# Patient Record
Sex: MALE | Race: WHITE | NOT HISPANIC OR LATINO | Employment: OTHER | URBAN - METROPOLITAN AREA
[De-identification: names, ages, dates, MRNs, and addresses within clinical notes are randomized per-mention and may not be internally consistent; named-entity substitution may affect disease eponyms.]

---

## 2023-03-19 ENCOUNTER — APPOINTMENT (OUTPATIENT)
Dept: MRI IMAGING | Facility: CLINIC | Age: 68
End: 2023-03-19
Attending: EMERGENCY MEDICINE
Payer: COMMERCIAL

## 2023-03-19 ENCOUNTER — HOSPITAL ENCOUNTER (EMERGENCY)
Facility: CLINIC | Age: 68
Discharge: HOME | End: 2023-03-19
Attending: EMERGENCY MEDICINE | Admitting: EMERGENCY MEDICINE
Payer: COMMERCIAL

## 2023-03-19 VITALS
RESPIRATION RATE: 16 BRPM | WEIGHT: 239 LBS | BODY MASS INDEX: 34.22 KG/M2 | TEMPERATURE: 97.6 F | DIASTOLIC BLOOD PRESSURE: 70 MMHG | HEIGHT: 70 IN | OXYGEN SATURATION: 97 % | SYSTOLIC BLOOD PRESSURE: 122 MMHG | HEART RATE: 68 BPM

## 2023-03-19 DIAGNOSIS — H53.2 DIPLOPIA: ICD-10-CM

## 2023-03-19 LAB
ANION GAP SERPL CALCULATED.3IONS-SCNC: 14 MMOL/L (ref 5–18)
BASOPHILS # BLD AUTO: 0 10E3/UL (ref 0–0.2)
BASOPHILS NFR BLD AUTO: 0 %
BUN SERPL-MCNC: 17 MG/DL (ref 8–22)
C REACTIVE PROTEIN LHE: 0.3 MG/DL (ref 0–?)
CALCIUM SERPL-MCNC: 9.4 MG/DL (ref 8.5–10.5)
CHLORIDE BLD-SCNC: 105 MMOL/L (ref 98–107)
CO2 SERPL-SCNC: 22 MMOL/L (ref 22–31)
CREAT SERPL-MCNC: 0.78 MG/DL (ref 0.7–1.3)
EOSINOPHIL # BLD AUTO: 0.1 10E3/UL (ref 0–0.7)
EOSINOPHIL NFR BLD AUTO: 2 %
ERYTHROCYTE [DISTWIDTH] IN BLOOD BY AUTOMATED COUNT: 13.5 % (ref 10–15)
ERYTHROCYTE [SEDIMENTATION RATE] IN BLOOD BY WESTERGREN METHOD: 30 MM/HR (ref 0–15)
GFR SERPL CREATININE-BSD FRML MDRD: >90 ML/MIN/1.73M2
GLUCOSE BLD-MCNC: 109 MG/DL (ref 70–125)
HCT VFR BLD AUTO: 43 % (ref 40–53)
HGB BLD-MCNC: 14.4 G/DL (ref 13.3–17.7)
IMM GRANULOCYTES # BLD: 0 10E3/UL
IMM GRANULOCYTES NFR BLD: 0 %
LYMPHOCYTES # BLD AUTO: 2.5 10E3/UL (ref 0.8–5.3)
LYMPHOCYTES NFR BLD AUTO: 34 %
MCH RBC QN AUTO: 28.1 PG (ref 26.5–33)
MCHC RBC AUTO-ENTMCNC: 33.5 G/DL (ref 31.5–36.5)
MCV RBC AUTO: 84 FL (ref 78–100)
MONOCYTES # BLD AUTO: 0.5 10E3/UL (ref 0–1.3)
MONOCYTES NFR BLD AUTO: 7 %
NEUTROPHILS # BLD AUTO: 4.2 10E3/UL (ref 1.6–8.3)
NEUTROPHILS NFR BLD AUTO: 57 %
NRBC # BLD AUTO: 0 10E3/UL
NRBC BLD AUTO-RTO: 0 /100
PLATELET # BLD AUTO: 227 10E3/UL (ref 150–450)
POTASSIUM BLD-SCNC: 3.5 MMOL/L (ref 3.5–5)
RBC # BLD AUTO: 5.12 10E6/UL (ref 4.4–5.9)
SODIUM SERPL-SCNC: 141 MMOL/L (ref 136–145)
WBC # BLD AUTO: 7.5 10E3/UL (ref 4–11)

## 2023-03-19 PROCEDURE — 85004 AUTOMATED DIFF WBC COUNT: CPT | Performed by: EMERGENCY MEDICINE

## 2023-03-19 PROCEDURE — 99285 EMERGENCY DEPT VISIT HI MDM: CPT | Mod: 25

## 2023-03-19 PROCEDURE — A9585 GADOBUTROL INJECTION: HCPCS | Performed by: EMERGENCY MEDICINE

## 2023-03-19 PROCEDURE — 86140 C-REACTIVE PROTEIN: CPT | Performed by: EMERGENCY MEDICINE

## 2023-03-19 PROCEDURE — 86038 ANTINUCLEAR ANTIBODIES: CPT | Performed by: EMERGENCY MEDICINE

## 2023-03-19 PROCEDURE — 70544 MR ANGIOGRAPHY HEAD W/O DYE: CPT

## 2023-03-19 PROCEDURE — 96374 THER/PROPH/DIAG INJ IV PUSH: CPT

## 2023-03-19 PROCEDURE — 255N000002 HC RX 255 OP 636: Performed by: EMERGENCY MEDICINE

## 2023-03-19 PROCEDURE — 80048 BASIC METABOLIC PNL TOTAL CA: CPT | Performed by: EMERGENCY MEDICINE

## 2023-03-19 PROCEDURE — 70553 MRI BRAIN STEM W/O & W/DYE: CPT

## 2023-03-19 PROCEDURE — 250N000011 HC RX IP 250 OP 636: Performed by: EMERGENCY MEDICINE

## 2023-03-19 PROCEDURE — 36415 COLL VENOUS BLD VENIPUNCTURE: CPT | Performed by: EMERGENCY MEDICINE

## 2023-03-19 PROCEDURE — 85652 RBC SED RATE AUTOMATED: CPT | Performed by: EMERGENCY MEDICINE

## 2023-03-19 RX ORDER — ROSUVASTATIN CALCIUM 5 MG/1
5 TABLET, COATED ORAL
COMMUNITY
Start: 2022-12-08

## 2023-03-19 RX ORDER — LISINOPRIL AND HYDROCHLOROTHIAZIDE 20; 25 MG/1; MG/1
1 TABLET ORAL DAILY
COMMUNITY
Start: 2023-02-06

## 2023-03-19 RX ORDER — DICLOFENAC SODIUM 75 MG/1
TABLET, DELAYED RELEASE ORAL
COMMUNITY
Start: 2023-03-07

## 2023-03-19 RX ORDER — DICLOFENAC SODIUM 75 MG/1
TABLET, DELAYED RELEASE ORAL 2 TIMES DAILY
COMMUNITY
End: 2023-03-19

## 2023-03-19 RX ORDER — KETOROLAC TROMETHAMINE 15 MG/ML
15 INJECTION, SOLUTION INTRAMUSCULAR; INTRAVENOUS ONCE
Status: COMPLETED | OUTPATIENT
Start: 2023-03-19 | End: 2023-03-19

## 2023-03-19 RX ORDER — GADOBUTROL 604.72 MG/ML
10 INJECTION INTRAVENOUS ONCE
Status: COMPLETED | OUTPATIENT
Start: 2023-03-19 | End: 2023-03-19

## 2023-03-19 RX ADMIN — GADOBUTROL 10 ML: 604.72 INJECTION INTRAVENOUS at 13:50

## 2023-03-19 RX ADMIN — KETOROLAC TROMETHAMINE 15 MG: 15 INJECTION, SOLUTION INTRAMUSCULAR; INTRAVENOUS at 15:23

## 2023-03-19 ASSESSMENT — ACTIVITIES OF DAILY LIVING (ADL)
ADLS_ACUITY_SCORE: 35
ADLS_ACUITY_SCORE: 35

## 2023-03-19 NOTE — DISCHARGE INSTRUCTIONS
Your symptoms seem most consistent with a mild cranial nerve palsy which is an impairment of a nerve that services the muscles of the eye and is now creating 2 images that do not align.  Your case was discussed with neurology who recommended close follow-up on an outpatient basis a referral has been placed expect phone call tomorrow for appointment time.  Also please follow-up with your ophthalmologist this week.  If you have escalation to your symptoms you will need to return to the emergency department for repeat assessment.  Recommendations from neurology to take a daily aspirin.

## 2023-03-19 NOTE — ED TRIAGE NOTES
Was driving 2-3 days ago and experienced double vision.  Recently had new eye glasses and thought it was due to glasses,  Put old glasses on and still expereinced the double vision.    Was seen at [facility] and sent to ED for further MRI examination.

## 2023-03-19 NOTE — ED NOTES
EMERGENCY DEPARTMENT SIGN OUT NOTE        ED COURSE AND MEDICAL DECISION MAKING  2:00 PM Patient was signed out to me by Dr Dillon Lea.  3:10 PM I introduced myself to patient and updated him on results and plan.   3:43 PM I discussed the case with Dr. Sandeep Finney, neurology.  3:48 PM I rechecked and updated the patient on plan for discharge. Patient agreeable. Reviewed supportive cares, symptomatic treatment, outpatient follow up, and reasons to return to the Emergency Department. All questions and concerns were addressed. Patient to be discharged by ED RN.       In brief, Luis Azevedo is a 67 year old male who initially presented with diplopia.     At time of sign out, disposition was pending MRI imaging.     3:53 PM  Pleasant 67-year-old male with past medical history of well-controlled diabetes presenting with 3-day history of binocular diplopia.  Recent upper respiratory viral illness essentially recovered.  At the time of signout patient had MRI imaging that was pending.  Due to patient's body habitus the MRI technicians were unable to complete the neck MRA but the brain MRI and MRA did not demonstrate any significant pathology or findings to account for patient's symptoms.  I conducted a full neurological exam of the patient and did not appreciate any extraocular movement deficits or clear cranial nerve palsies.  He described a binocular diplopia there was no visual field deficits to clinical examination his pupils were equal and reactive his anterior chambers were clear with closure of either eye his vision was completely intact with normal visual acuity.  I felt that the symptoms seem most consistent with a subtle or mild cranial nerve palsy.  Taken the patient through full range of motion of his eyes he notes that the right images slightly higher than the left image and that is maintained throughout left right superior however when he looks inferiorly the images then start to separate more and  become further apart.  Overall I think in light of patient's stability 3 days of symptoms negative MRIs he is appropriate for discharge with close follow-up on outpatient basis.  I had a discussion with neurology who agreed with the plan of care requested add on ESR CRP and SELINA those can be followed up on outpatient basis.  Also brought up the possibility of Guillain-Barré Song Davis variant I think that is less likely at this point although certainly a possibility on examination patient's other neurological functions in terms of reflexes and cerebellar function all appear to be intact he is ambulatory without issue however this could be early with further symptoms to progress.  I discussed all of this with the patient.  Plan of care for discharge with close follow-up with neurology this week.  Patient will also follow-up with his ophthalmologist who he has established care with an recent visit back in February.  Patient understands that if he finds any additional symptoms or progression of his symptoms he should return to the nearest emergency department for repeat assessment.    FINAL IMPRESSION    1. Diplopia        ED MEDS  Medications   gadobutrol (GADAVIST) injection 10 mL (10 mLs Intravenous $Given 3/19/23 1350)   ketorolac (TORADOL) injection 15 mg (15 mg Intravenous $Given 3/19/23 8843)       LAB  Labs Ordered and Resulted from Time of ED Arrival to Time of ED Departure   BASIC METABOLIC PANEL - Normal       Result Value    Sodium 141      Potassium 3.5      Chloride 105      Carbon Dioxide (CO2) 22      Anion Gap 14      Urea Nitrogen 17      Creatinine 0.78      Calcium 9.4      Glucose 109      GFR Estimate >90     CBC WITH PLATELETS AND DIFFERENTIAL    WBC Count 7.5      RBC Count 5.12      Hemoglobin 14.4      Hematocrit 43.0      MCV 84      MCH 28.1      MCHC 33.5      RDW 13.5      Platelet Count 227      % Neutrophils 57      % Lymphocytes 34      % Monocytes 7      % Eosinophils 2      %  Basophils 0      % Immature Granulocytes 0      NRBCs per 100 WBC 0      Absolute Neutrophils 4.2      Absolute Lymphocytes 2.5      Absolute Monocytes 0.5      Absolute Eosinophils 0.1      Absolute Basophils 0.0      Absolute Immature Granulocytes 0.0      Absolute NRBCs 0.0     ERYTHROCYTE SEDIMENTATION RATE AUTO   ANTI NUCLEAR JAY IGG BY IFA WITH REFLEX   CRP INFLAMMATION       RADIOLOGY    MR Brain w/o & w Contrast   Final Result   IMPRESSION:   HEAD MRI:    1.  No acute intracranial finding.   2.  Mild presumed chronic microvascular ischemic change.      HEAD MRA:    1.  Apparent moderate short-segment stenosis involving a proximal M2 segment inferior division branch of the left MCA, favored to be artifactual.   2.  No additional high-grade stenosis. No large vessel occlusion or aneurysm.      MRA Brain ([place]) wo Contrast   Final Result   IMPRESSION:   HEAD MRI:    1.  No acute intracranial finding.   2.  Mild presumed chronic microvascular ischemic change.      HEAD MRA:    1.  Apparent moderate short-segment stenosis involving a proximal M2 segment inferior division branch of the left MCA, favored to be artifactual.   2.  No additional high-grade stenosis. No large vessel occlusion or aneurysm.      MRA Neck (Carotids) wo & w Contrast    (Results Pending)       DISCHARGE MEDS  New Prescriptions    No medications on file       Joselito Cartwright MD  [facility] EMERGENCY ROOM  [facility] [facility] [address] [phone]       Joselito Cartwright MD  03/19/23 1062

## 2023-03-19 NOTE — ED NOTES
Expected Patient Referral to ED  10:31 AM    Referring Clinic/Provider:  [facility]     Reason for referral/Clinical facts:  Binocular diplopia for the past 3 days. Head CT done at urgent care was normal. No eye pain for redness. Possible need for MRI    Recommendations provided:  Send to ED for further evaluation    Caller was informed that this institution does possess the capabilities and/or resources to provide for patient and should be transferred to our facility.    Discussed that if direct admit is sought and any hurdles are encountered, this ED would be happy to see the patient and evaluate.    Informed caller that recommendations provided are recommendations based only on the facts provided and that they responsible to accept or reject the advice, or to seek a formal in person consultation as needed and that this ED will see/treat patient should they arrive.      DILLON LEA MD  [facility] EMERGENCY ROOM  [address] [place] [id number]  [phone]       Dillon Lea MD  03/19/23 1032

## 2023-03-19 NOTE — ED PROVIDER NOTES
EMERGENCY DEPARTMENT ENCOUNTER      NAME: Luis Azevedo  AGE: 67 year old male  YOB: 1955  MRN: [id number]  EVALUATION DATE & TIME: 3/19/2023 11:07 AM    PCP: Ricardo Youngblood    ED PROVIDER: Dillon Lea M.D.      Chief Complaint   Patient presents with     Eye Problem         FINAL IMPRESSION:  1. Diplopia          ED COURSE & MEDICAL DECISION MAKING:    Pertinent Labs & Imaging studies reviewed. (See chart for details)  67 year old male presents to the Emergency Department for evaluation of double vision. Patient appears non toxic with stable vitals signs, patient afebrile with no tachycardia or hypoxia, no increased work of breathing.  Lungs clear, abdomen benign.  On exam patient does endorse double vision, worse when looking towards his left upper field.  No loss of vision, eye pain, no black curtain coming down over his visual field, no other focal deficits.  Considered CVA, given duration of symptoms and otherwise well appearance no negation for code stroke.  Nothing to suggest acute angle-closure glaucoma, temporal arteritis, trigeminal neuralgia, open globe, or other more malicious etiology of symptoms.  We will obtain screening labs and MRI imaging.    Reassessment: Labs by my independent interpretation showed no acute concerning findings, no electrolyte abnormalities or acute kidney injury.  At time of this dictation MRI imaging is pending.  Final disposition will be depend upon the pending studies and the patient's clinical trajectory.  Patient was signed out to the oncoming afternoon physician.    Medical Decision Making    History:    Supplemental history from: Documented in chart, if applicable    External Record(s) reviewed: Documented in chart, if applicable.    Work Up:    Chart documentation includes differential considered and any EKGs or imaging independently interpreted by provider, where specified.    In additional to work up documented, I considered the following work up:  Documented in chart, if applicable.    External consultation:    Discussion of management with another provider: Documented in chart, if applicable    Complicating factors:    Care impacted by chronic illness: Diabetes, Hyperlipidemia and Hypertension    Care affected by social determinants of health: N/A    Disposition considerations: Admission considered. Patient was signed out to the oncoming physician, disposition pending.      11:13 AM I met with the patient to gather history and to perform my initial exam. We discussed plans for the ED course, including diagnostic testing and treatment. PPE: N95 mask, gloves, eye protection  1:33 PM Per nursing, the patient has limited MRI neck imaging secondary to body habitus.       At the conclusion of the encounter I discussed the results of all of the tests and the disposition. The questions were answered and return precautions provided. The patient or family acknowledged understanding and was agreeable with the care plan.         MEDICATIONS GIVEN IN THE EMERGENCY:  Medications   gadobutrol (GADAVIST) injection 10 mL (10 mLs Intravenous $Given 3/19/23 1350)       NEW PRESCRIPTIONS STARTED AT TODAY'S ER VISIT  New Prescriptions    No medications on file            =================================================================    HPI    Patient information was obtained from: patient     Use of : N/A         Luis Azevedo is a 67 year old male who presents with double vision.    Per chart review, patient presented to [facility] 3/19/2023 for double vision. Non-contrast head CT was negative for any obvious ischemia or mass. Patient sent to the ED for further neurologic work up and MRI.     Patient reports that 3-4 days ago while driving he noticed he was having double vision as he saw two versions of a car and [place] split. He got new glasses about a week ago and thought his symptoms may be because of that, but when he tried to drive with his  old glasses yesterday he experienced the same double vision. When the patient closes one eye, the double vision is gone. When he looks to the right side, his vision is normal but the further he looks to the left he experiences increasing double vision. Patient notes he had a head cold about 2 weeks ago and still feels that the left side of his face is congested. Patient denies any recent falls or hitting his head. He denies any focal weakness, speech changes, or black spots in his vision. Patient denies any eye redness, discharge, or pain. No other complaints or concerns expressed at this time.     REVIEW OF SYSTEMS   Constitutional:  Denies fever, chills  HENT: Endorses double vision, left sided congestion. Denies black spots in vision.   Respiratory:  Denies productive cough or increased work of breathing  Cardiovascular:  Denies chest pain, palpitations  GI:  Denies abdominal pain, nausea, vomiting, or change in bowel or bladder habits   Musculoskeletal:  Denies any new muscle/joint swelling, recent falls  Skin:  Denies rash   Neurologic:  Denies focal weakness, speech changes  All systems negative except as marked.     PAST MEDICAL HISTORY:  History reviewed. No pertinent past medical history.    PAST SURGICAL HISTORY:  History reviewed. No pertinent surgical history.      CURRENT MEDICATIONS:    Prior to Admission medications    Medication Sig Start Date End Date Taking? Authorizing Provider   diclofenac (VOLTAREN) 75 MG EC tablet TAKE 1 TABLET (75 MG) BY MOUTH TWICE A DAY 3/7/23  Yes Reported, Patient   lisinopril-hydrochlorothiazide (ZESTORETIC) 20-25 MG tablet Take 1 tablet by mouth daily 2/6/23  Yes Reported, Patient   metFORMIN (GLUCOPHAGE) 500 MG tablet TAKE 1 TABLET (500 MG) BY MOUTH TWICE A DAY WITH MEALS 3/1/23  Yes Reported, Patient   rosuvastatin (CRESTOR) 5 MG tablet Take 5 mg by mouth 12/8/22  Yes Reported, Patient        ALLERGIES:  No Known Allergies    FAMILY HISTORY:  History reviewed. No  "pertinent family history.    SOCIAL HISTORY:   Social History     Socioeconomic History     Marital status:        VITALS:  Patient Vitals for the past 24 hrs:   BP Temp Temp src Pulse Resp SpO2 Height Weight   03/19/23 1059 (!) 146/79 97.6  F (36.4  C) Temporal 63 16 97 % 1.778 m (5' 10\") 108.4 kg (239 lb)        PHYSICAL EXAM    Constitutional:  Awake, alert, in no apparent distress  HENT:  Normocephalic, Atraumatic. Bilateral external ears normal. Oropharynx moist. Nose normal. Neck- Normal range of motion with no guarding, No midline cervical tenderness, Supple, No stridor.   Eyes:  PERRL, EOMI with no signs of entrapment, Conjunctiva normal, No discharge.   Respiratory:  Normal breath sounds, No respiratory distress, No wheezing.    Cardiovascular:  Normal heart rate, Normal rhythm, No appreciable rubs or gallops.   GI:  Soft, No tenderness, No distension, No palpable masses  Musculoskeletal:  Intact distal pulses, No edema. Good range of motion in all major joints. No tenderness to palpation or major deformities noted.  Integument:  Warm, Dry, No erythema, No rash.   Neurologic:  Alert & oriented, Normal motor function, cranial nerves II through XII intact, no facial droop or pronator drift.  Normal visual field testing but patient states he does see double vision in the left upper visual field.  Psychiatric:  Affect normal, Judgment normal, Mood normal.     [employer] Stroke Scale  Exam Interval: Baseline   Score    Level of consciousness: (0)   Alert, keenly responsive    LOC questions: (0)   Answers both questions correctly    LOC commands: (0)   Performs both tasks correctly    Best gaze: (0)   Normal    Visual: (0)   No visual loss    Facial palsy: (0)   Normal symmetrical movements    Motor arm (left): (0)   No drift    Motor arm (right): (0)   No drift    Motor leg (left): (0)   No drift    Motor leg (right): (0)   No drift    Limb ataxia: (0)   Absent    Sensory: (0)   Normal- " no sensory loss    Best language: (0)   Normal- no aphasia    Dysarthria: (0)   Normal    Extinction and inattention: (0)   No abnormality        Total Score:  0       LAB:  All pertinent labs reviewed and interpreted.  Results for orders placed or performed during the hospital encounter of 03/19/23   Basic metabolic panel   Result Value Ref Range    Sodium 141 136 - 145 mmol/L    Potassium 3.5 3.5 - 5.0 mmol/L    Chloride 105 98 - 107 mmol/L    Carbon Dioxide (CO2) 22 22 - 31 mmol/L    Anion Gap 14 5 - 18 mmol/L    Urea Nitrogen 17 8 - 22 mg/dL    Creatinine 0.78 0.70 - 1.30 mg/dL    Calcium 9.4 8.5 - 10.5 mg/dL    Glucose 109 70 - 125 mg/dL    GFR Estimate >90 >60 mL/min/1.73m2   CBC with platelets and differential   Result Value Ref Range    WBC Count 7.5 4.0 - 11.0 10e3/uL    RBC Count 5.12 4.40 - 5.90 10e6/uL    Hemoglobin 14.4 13.3 - 17.7 g/dL    Hematocrit 43.0 40.0 - 53.0 %    MCV 84 78 - 100 fL    MCH 28.1 26.5 - 33.0 pg    MCHC 33.5 31.5 - 36.5 g/dL    RDW 13.5 10.0 - 15.0 %    Platelet Count 227 150 - 450 10e3/uL    % Neutrophils 57 %    % Lymphocytes 34 %    % Monocytes 7 %    % Eosinophils 2 %    % Basophils 0 %    % Immature Granulocytes 0 %    NRBCs per 100 WBC 0 <1 /100    Absolute Neutrophils 4.2 1.6 - 8.3 10e3/uL    Absolute Lymphocytes 2.5 0.8 - 5.3 10e3/uL    Absolute Monocytes 0.5 0.0 - 1.3 10e3/uL    Absolute Eosinophils 0.1 0.0 - 0.7 10e3/uL    Absolute Basophils 0.0 0.0 - 0.2 10e3/uL    Absolute Immature Granulocytes 0.0 <=0.4 10e3/uL    Absolute NRBCs 0.0 10e3/uL       RADIOLOGY:  MR Brain w/o & w Contrast    (Results Pending)   MRA Brain ([place]) wo Contrast    (Results Pending)   MRA Neck (Carotids) wo & w Contrast    (Results Pending)          EKG:      I have independently reviewed and interpreted the EKG(s) documented above.    PROCEDURES:          I, Ani Mcdaniel, am serving as a scribe to document services personally performed by Dillon Lea MD, based on my observation  and the provider's statements to me. I, Dillon Lea MD attest that Ani Jonas is acting in a scribe capacity, has observed my performance of the services and has documented them in accordance with my direction.    Dillon Lea M.D.  Emergency Medicine  [facility] EMERGENCY ROOM  [address] [facility] [address]  [phone]  Dept: [phone]     Dillon Lea MD  03/19/23 4810

## 2023-03-20 LAB — ANA SER QL IF: NEGATIVE

## 2023-03-21 ENCOUNTER — LAB (OUTPATIENT)
Dept: LAB | Facility: CLINIC | Age: 68
End: 2023-03-21
Payer: COMMERCIAL

## 2023-03-21 ENCOUNTER — OFFICE VISIT (OUTPATIENT)
Dept: NEUROLOGY | Facility: CLINIC | Age: 68
End: 2023-03-21
Payer: COMMERCIAL

## 2023-03-21 ENCOUNTER — TELEPHONE (OUTPATIENT)
Dept: OPHTHALMOLOGY | Facility: CLINIC | Age: 68
End: 2023-03-21

## 2023-03-21 VITALS
WEIGHT: 247 LBS | SYSTOLIC BLOOD PRESSURE: 107 MMHG | BODY MASS INDEX: 35.36 KG/M2 | DIASTOLIC BLOOD PRESSURE: 62 MMHG | HEART RATE: 76 BPM | HEIGHT: 70 IN

## 2023-03-21 DIAGNOSIS — Z86.79 HX OF ESSENTIAL HYPERTENSION: ICD-10-CM

## 2023-03-21 DIAGNOSIS — E11.49 DIABETES MELLITUS TYPE 2 WITH NEUROLOGICAL MANIFESTATIONS (H): ICD-10-CM

## 2023-03-21 DIAGNOSIS — H53.2 DIPLOPIA: Primary | ICD-10-CM

## 2023-03-21 DIAGNOSIS — H53.2 DIPLOPIA: ICD-10-CM

## 2023-03-21 DIAGNOSIS — H49.02 CN III PALSY, LEFT: ICD-10-CM

## 2023-03-21 LAB
HBA1C MFR BLD: 6.5 %
TSH SERPL DL<=0.005 MIU/L-ACNC: 1.85 UIU/ML (ref 0.3–5)
VIT B12 SERPL-MCNC: 476 PG/ML (ref 232–1245)

## 2023-03-21 PROCEDURE — 36415 COLL VENOUS BLD VENIPUNCTURE: CPT

## 2023-03-21 PROCEDURE — 99205 OFFICE O/P NEW HI 60 MIN: CPT | Performed by: PSYCHIATRY & NEUROLOGY

## 2023-03-21 PROCEDURE — 86618 LYME DISEASE ANTIBODY: CPT

## 2023-03-21 PROCEDURE — 82607 VITAMIN B-12: CPT

## 2023-03-21 PROCEDURE — 83036 HEMOGLOBIN GLYCOSYLATED A1C: CPT

## 2023-03-21 PROCEDURE — 86038 ANTINUCLEAR ANTIBODIES: CPT

## 2023-03-21 PROCEDURE — 84443 ASSAY THYROID STIM HORMONE: CPT

## 2023-03-21 PROCEDURE — 84425 ASSAY OF VITAMIN B-1: CPT

## 2023-03-21 PROCEDURE — 82164 ANGIOTENSIN I ENZYME TEST: CPT

## 2023-03-21 PROCEDURE — 86037 ANCA TITER EACH ANTIBODY: CPT

## 2023-03-21 NOTE — TELEPHONE ENCOUNTER
[facility] Call Center    Phone Message    May a detailed message be left on voicemail: yes     Reason for Call: Appointment Intake    Referring Provider Name: Amando Finney MD in [facility]    Diagnosis and/or Symptoms: Diplopia [H53.2]  Double vision, new onset    Priority: 1-2 Weeks    Thank you,      Action Taken: Message routed to:  [facility] eye    Travel Screening: Not Applicable

## 2023-03-21 NOTE — LETTER
3/21/2023         RE: Luis Azevedo  [address] [facility] [address]        Dear Colleague,    Thank you for referring your patient, Luis Azevedo, to the [facility] NEUROLOGY [facility]INIC [place]. Please see a copy of my visit note below.    NEUROLOGY OUTPATIENT CONSULT NOTE  Mar 21, 2023     CHIEF COMPLAINT/REASON FOR VISIT/REASON FOR CONSULT  Patient presents with:  Eye Problem: Pt has been experiencing double vision since Friday. Pt was seen in urgency Room    REASON FOR CONSULTATION- Diplopia    REFERRAL SOURCE  Dr. Cartwright  CC Dr. Cartwright    HISTORY OF PRESENT ILLNESS  Luis Azevedo is a 67 year old male seen today for evaluation of double vision.  Double vision came on about a week ago.  This happened while he was driving.  There was no provoking factors.  Denies any major headaches denies any neck pain.  Given the vision loss there is no other neurological symptoms.  No associated chest pains or palpitations.  Does have a history of hypertension though it is under good control.  His diabetes is also under good control.  A1c has been 6.6.  He did present to the emergency room and an MRI of the brain was done which was negative for any structural lesions.  MRA was negative for aneurysms.  ESR was checked which was slightly elevated.  This chronically does run high for him.  Does report mild improvement in his symptoms though no major worsening.  Has not been able to see the eye doctor.  Does have a peripheral neuropathy due to diabetes.  Has not had similar symptoms in the past.    Double vision is horizontal.  Closing 1 eye the double vision does go away.  Does complain more of it towards the left side.    Previous history is reviewed and this is unchanged.    PAST MEDICAL/SURGICAL HISTORY  No past medical history on file.  There is no problem list on file for this patient.  Significant diabetes, high blood pressure, high cholesterol, sleep disorder, sleep apnea.  Consider sleep    FAMILY  "HISTORY  No family history on file.   Family history positive for stroke in his grandfather.    SOCIAL HISTORY  Social History     Tobacco Use     Smoking status: Never     Smokeless tobacco: Never   Substance Use Topics     Alcohol use: Yes     Comment: minimal       SYSTEMS REVIEW  Twelve-system ROS was done and other than the HPI this was negative except vision symptoms.    MEDICATIONS  diclofenac (VOLTAREN) 75 MG EC tablet, TAKE 1 TABLET (75 MG) BY MOUTH TWICE A DAY  lisinopril-hydrochlorothiazide (ZESTORETIC) 20-25 MG tablet, Take 1 tablet by mouth daily  metFORMIN (GLUCOPHAGE) 500 MG tablet, TAKE 1 TABLET (500 MG) BY MOUTH TWICE A DAY WITH MEALS  rosuvastatin (CRESTOR) 5 MG tablet, Take 5 mg by mouth    No current facility-administered medications on file prior to visit.       PHYSICAL EXAMINATION  VITALS: /62 (BP Location: Right arm, Patient Position: Sitting)   Pulse 76   Ht 1.778 m (5' 10\")   Wt 112 kg (247 lb)   BMI 35.44 kg/m    GENERAL: Healthy appearing, alert, no acute distress, normal habitus.  CARDIOVASCULAR: Extremities warm and well perfused. Pulses present.   NEUROLOGICAL:  Patient is awake and oriented to self, place and time.  Attention span is normal.  Memory is grossly intact.  Language is fluent and follows commands appropriately.  Appropriate fund of knowledge. Cranial nerves 2-12 are intact except mild double vision when looking towards the left extreme.  No obvious cranial neuropathy/eye deviation identified. There is no pronator drift.  Motor exam shows 5/5 strength in all extremities.  Tone is symmetric bilaterally in upper and lower extremities.  Reflexes are symmetric and absent in upper extremities and lower extremities. Sensory exam is grossly intact to light touch, pin prick and vibration.  Finger to nose and heel to shin is without dysmetria.  Romberg is negative.  Gait is normal and the patient is able to do tandem walk and walk on toes and heels with mild " difficulty.    DIAGNOSTICS  MRI-images reviewed.  No acute structural lesion noted.  HEAD MRI:   1.  No acute intracranial finding.  2.  Mild presumed chronic microvascular ischemic change.     HEAD MRA:   1.  Apparent moderate short-segment stenosis involving a proximal M2 segment inferior division branch of the left MCA, favored to be artifactual.  2.  No additional high-grade stenosis. No large vessel occlusion or aneurysm.    RELEVANT LABS  Component      Latest Ref Rng & Units 3/19/2023   WBC      4.0 - 11.0 10e3/uL 7.5   RBC Count      4.40 - 5.90 10e6/uL 5.12   Hemoglobin      13.3 - 17.7 g/dL 14.4   Hematocrit      40.0 - 53.0 % 43.0   MCV      78 - 100 fL 84   MCH      26.5 - 33.0 pg 28.1   MCHC      31.5 - 36.5 g/dL 33.5   RDW      10.0 - 15.0 % 13.5   Platelet Count      150 - 450 10e3/uL 227   % Neutrophils      % 57   % Lymphocytes      % 34   % Monocytes      % 7   % Eosinophils      % 2   % Basophils      % 0   % Immature Granulocytes      % 0   NRBCs per 100 WBC      <1 /100 0   Absolute Neutrophils      1.6 - 8.3 10e3/uL 4.2   Absolute Lymphocytes      0.8 - 5.3 10e3/uL 2.5   Absolute Monocytes      0.0 - 1.3 10e3/uL 0.5   Absolute Eosinophils      0.0 - 0.7 10e3/uL 0.1   Absolute Basophils      0.0 - 0.2 10e3/uL 0.0   Absolute Immature Granulocytes      <=0.4 10e3/uL 0.0   Absolute NRBCs      10e3/uL 0.0   Sodium      136 - 145 mmol/L 141   Potassium      3.5 - 5.0 mmol/L 3.5   Chloride      98 - 107 mmol/L 105   Carbon Dioxide      22 - 31 mmol/L 22   Anion Gap      5 - 18 mmol/L 14   Urea Nitrogen      8 - 22 mg/dL 17   Creatinine      0.70 - 1.30 mg/dL 0.78   Calcium      8.5 - 10.5 mg/dL 9.4   Glucose      70 - 125 mg/dL 109   GFR Estimate      >60 mL/min/1.73m2 >90   Sed Rate      0 - 15 mm/hr 30 (H)   SELINA interpretation      Negative Negative   CRP      0.0 - <0.8 mg/dL 0.3       OUTSIDE RECORDS  Outside referral notes and chart notes were reviewed and pertinent information has been  summarized (in addition to the HPI):-        IMPRESSION/REPORT/PLAN  Diplopia  Rule out CN III palsy, left pupil sparing  History of diabetes and hypertension  History of positive SELINA  Elevated ESR    This is a 67 year old male with new onset of horizontal binocular double vision.  Exam shows increased double vision on the left side.  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  We discussed prognosis/etiologies of this kind of neuropathy.  MRI brain was negative for structural lesions.  Blood work overall has been noncontributory except for elevated ESR.  He does have history of hypertension and diabetes.  Most likely this is ischemic in nature.  Discussed prognosis.  We will check blood work to look for other etiologies of the double vision.  We will also refer him to optometry/ophthalmologist to see if they can identify an exact cause for the double vision.  He should discuss ocular rehab with them.  Also should get prisms in his glasses.    I can see him back in 2 months.  Recommend continuing aggressive control of his diabetes and hypertension.  Could consider an aspirin.  Exercise and healthy lifestyle.    -     Adult Eye  Referral; Future  -     Vitamin B12; Future  -     Vitamin B1 whole blood; Future  -     TSH with free T4 reflex; Future  -     Lyme Disease Total Abs Bld with Reflex to Confirm CLIA; Future  -     Anti Nuclear Angy IgG by IFA with Reflex; Future  -     ANCA IgG by IFA with Reflex to Titer; Future  -     Angiotensin converting enzyme; Future  -     Hemoglobin A1c; Future    Return in about 2 months (around 5/21/2023) for In-Clinic Visit (must), After testing.    Over 60 minutes were spent coordinating the care for the patient on the day of the encounter.  This includes previsit, during visit and post visit activities as documented above.  High risk.  Extensive testing ordered.  Reviewing hospital notes/MRIs.  Counseled patient.  Discussed prognosis.  (Activities  include but not inclusive of reviewing chart, reviewing outside records, reviewing labs and imaging study results as well as the images, patient visit time including getting history and exam,  use if applicable, review of test results with the patient and coming up with a plan in a shared model, counseling patient and family, education and answering patient questions, EMR , EMR diagnosis entry and problem list management, medication reconciliation and prescription management if applicable, paperwork if applicable, printing documents and documentation of the visit activities.)        Amando Finney MD  Neurologist  [facility] Neurology [facility]  Tel:- [phone]    This note was dictated using voice recognition software.  Any grammatical or context distortions are unintentional and inherent to the software.        Again, thank you for allowing me to participate in the care of your patient.        Sincerely,        Amando Finney MD

## 2023-03-21 NOTE — NURSING NOTE
Chief Complaint   Patient presents with     Eye Problem     Pt has been experiencing double vision since Friday. Pt was seen in urgency Room     Ban Mcdaniel LPN on 3/21/2023 at 12:04 PM

## 2023-03-21 NOTE — TELEPHONE ENCOUNTER
Ok for first available with Neuro-ophthalmology after review by neuro-ophthalmology team    MRI recently done    Note to patient communicator to reach out for scheduling    Andreas Mendez RN 4:40 PM 03/21/23

## 2023-03-21 NOTE — PROGRESS NOTES
NEUROLOGY OUTPATIENT CONSULT NOTE  Mar 21, 2023     CHIEF COMPLAINT/REASON FOR VISIT/REASON FOR CONSULT  Patient presents with:  Eye Problem: Pt has been experiencing double vision since Friday. Pt was seen in urgency Room    REASON FOR CONSULTATION- Diplopia    REFERRAL SOURCE  Dr. Cartwright  CC Dr. Cartwright    HISTORY OF PRESENT ILLNESS  Luis Azevedo is a 67 year old male seen today for evaluation of double vision.  Double vision came on about a week ago.  This happened while he was driving.  There was no provoking factors.  Denies any major headaches denies any neck pain.  Given the vision loss there is no other neurological symptoms.  No associated chest pains or palpitations.  Does have a history of hypertension though it is under good control.  His diabetes is also under good control.  A1c has been 6.6.  He did present to the emergency room and an MRI of the brain was done which was negative for any structural lesions.  MRA was negative for aneurysms.  ESR was checked which was slightly elevated.  This chronically does run high for him.  Does report mild improvement in his symptoms though no major worsening.  Has not been able to see the eye doctor.  Does have a peripheral neuropathy due to diabetes.  Has not had similar symptoms in the past.    Double vision is horizontal.  Closing 1 eye the double vision does go away.  Does complain more of it towards the left side.    Previous history is reviewed and this is unchanged.    PAST MEDICAL/SURGICAL HISTORY  No past medical history on file.  There is no problem list on file for this patient.  Significant diabetes, high blood pressure, high cholesterol, sleep disorder, sleep apnea.  Consider sleep    FAMILY HISTORY  No family history on file.   Family history positive for stroke in his grandfather.    SOCIAL HISTORY  Social History     Tobacco Use     Smoking status: Never     Smokeless tobacco: Never   Substance Use Topics     Alcohol use: Yes     Comment:  "minimal       SYSTEMS REVIEW  Twelve-system ROS was done and other than the HPI this was negative except vision symptoms.    MEDICATIONS  diclofenac (VOLTAREN) 75 MG EC tablet, TAKE 1 TABLET (75 MG) BY MOUTH TWICE A DAY  lisinopril-hydrochlorothiazide (ZESTORETIC) 20-25 MG tablet, Take 1 tablet by mouth daily  metFORMIN (GLUCOPHAGE) 500 MG tablet, TAKE 1 TABLET (500 MG) BY MOUTH TWICE A DAY WITH MEALS  rosuvastatin (CRESTOR) 5 MG tablet, Take 5 mg by mouth    No current facility-administered medications on file prior to visit.       PHYSICAL EXAMINATION  VITALS: /62 (BP Location: Right arm, Patient Position: Sitting)   Pulse 76   Ht 1.778 m (5' 10\")   Wt 112 kg (247 lb)   BMI 35.44 kg/m    GENERAL: Healthy appearing, alert, no acute distress, normal habitus.  CARDIOVASCULAR: Extremities warm and well perfused. Pulses present.   NEUROLOGICAL:  Patient is awake and oriented to self, place and time.  Attention span is normal.  Memory is grossly intact.  Language is fluent and follows commands appropriately.  Appropriate fund of knowledge. Cranial nerves 2-12 are intact except mild double vision when looking towards the left extreme.  No obvious cranial neuropathy/eye deviation identified. There is no pronator drift.  Motor exam shows 5/5 strength in all extremities.  Tone is symmetric bilaterally in upper and lower extremities.  Reflexes are symmetric and absent in upper extremities and lower extremities. Sensory exam is grossly intact to light touch, pin prick and vibration.  Finger to nose and heel to shin is without dysmetria.  Romberg is negative.  Gait is normal and the patient is able to do tandem walk and walk on toes and heels with mild difficulty.    DIAGNOSTICS  MRI-images reviewed.  No acute structural lesion noted.  HEAD MRI:   1.  No acute intracranial finding.  2.  Mild presumed chronic microvascular ischemic change.     HEAD MRA:   1.  Apparent moderate short-segment stenosis involving a " proximal M2 segment inferior division branch of the left MCA, favored to be artifactual.  2.  No additional high-grade stenosis. No large vessel occlusion or aneurysm.    RELEVANT LABS  Component      Latest Ref Rng & Units 3/19/2023   WBC      4.0 - 11.0 10e3/uL 7.5   RBC Count      4.40 - 5.90 10e6/uL 5.12   Hemoglobin      13.3 - 17.7 g/dL 14.4   Hematocrit      40.0 - 53.0 % 43.0   MCV      78 - 100 fL 84   MCH      26.5 - 33.0 pg 28.1   MCHC      31.5 - 36.5 g/dL 33.5   RDW      10.0 - 15.0 % 13.5   Platelet Count      150 - 450 10e3/uL 227   % Neutrophils      % 57   % Lymphocytes      % 34   % Monocytes      % 7   % Eosinophils      % 2   % Basophils      % 0   % Immature Granulocytes      % 0   NRBCs per 100 WBC      <1 /100 0   Absolute Neutrophils      1.6 - 8.3 10e3/uL 4.2   Absolute Lymphocytes      0.8 - 5.3 10e3/uL 2.5   Absolute Monocytes      0.0 - 1.3 10e3/uL 0.5   Absolute Eosinophils      0.0 - 0.7 10e3/uL 0.1   Absolute Basophils      0.0 - 0.2 10e3/uL 0.0   Absolute Immature Granulocytes      <=0.4 10e3/uL 0.0   Absolute NRBCs      10e3/uL 0.0   Sodium      136 - 145 mmol/L 141   Potassium      3.5 - 5.0 mmol/L 3.5   Chloride      98 - 107 mmol/L 105   Carbon Dioxide      22 - 31 mmol/L 22   Anion Gap      5 - 18 mmol/L 14   Urea Nitrogen      8 - 22 mg/dL 17   Creatinine      0.70 - 1.30 mg/dL 0.78   Calcium      8.5 - 10.5 mg/dL 9.4   Glucose      70 - 125 mg/dL 109   GFR Estimate      >60 mL/min/1.73m2 >90   Sed Rate      0 - 15 mm/hr 30 (H)   SELINA interpretation      Negative Negative   CRP      0.0 - <0.8 mg/dL 0.3         OUTSIDE RECORDS  Outside referral notes and chart notes were reviewed and pertinent information has been summarized (in addition to the HPI):-        IMPRESSION/REPORT/PLAN  Diplopia  Rule out CN III palsy, left pupil sparing  History of diabetes and hypertension  History of positive SELINA  Elevated ESR    This is a 67 year old male with new onset of horizontal binocular  double vision.  Exam shows increased double vision on the left side.  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  We discussed prognosis/etiologies of this kind of neuropathy.  MRI brain was negative for structural lesions.  Blood work overall has been noncontributory except for elevated ESR.  He does have history of hypertension and diabetes.  Most likely this is ischemic in nature.  Discussed prognosis.  We will check blood work to look for other etiologies of the double vision.  We will also refer him to optometry/ophthalmologist to see if they can identify an exact cause for the double vision.  He should discuss ocular rehab with them.  Also should get prisms in his glasses.    I can see him back in 2 months.  Recommend continuing aggressive control of his diabetes and hypertension.  Could consider an aspirin.  Exercise and healthy lifestyle.    -     Adult Eye  Referral; Future  -     Vitamin B12; Future  -     Vitamin B1 whole blood; Future  -     TSH with free T4 reflex; Future  -     Lyme Disease Total Abs Bld with Reflex to Confirm CLIA; Future  -     Anti Nuclear Angy IgG by IFA with Reflex; Future  -     ANCA IgG by IFA with Reflex to Titer; Future  -     Angiotensin converting enzyme; Future  -     Hemoglobin A1c; Future    Return in about 2 months (around 5/21/2023) for In-Clinic Visit (must), After testing.    Over 60 minutes were spent coordinating the care for the patient on the day of the encounter.  This includes previsit, during visit and post visit activities as documented above.  High risk.  Extensive testing ordered.  Reviewing hospital notes/MRIs.  Counseled patient.  Discussed prognosis.  (Activities include but not inclusive of reviewing chart, reviewing outside records, reviewing labs and imaging study results as well as the images, patient visit time including getting history and exam,  use if applicable, review of test results with the patient and  coming up with a plan in a shared model, counseling patient and family, education and answering patient questions, EMR , EMR diagnosis entry and problem list management, medication reconciliation and prescription management if applicable, paperwork if applicable, printing documents and documentation of the visit activities.)        Amando Finney MD  Neurologist  [facility] Neurology [facility]  Tel:- [phone]    This note was dictated using voice recognition software.  Any grammatical or context distortions are unintentional and inherent to the software.

## 2023-03-22 ENCOUNTER — TELEPHONE (OUTPATIENT)
Dept: OPHTHALMOLOGY | Facility: CLINIC | Age: 68
End: 2023-03-22
Payer: COMMERCIAL

## 2023-03-22 LAB
ACE SERPL-CCNC: <10 U/L
ANA SER QL IF: NEGATIVE
ANCA AB PATTERN SER IF-IMP: NORMAL
B BURGDOR IGG+IGM SER QL: 0.13
C-ANCA TITR SER IF: NORMAL {TITER}

## 2023-03-22 NOTE — TELEPHONE ENCOUNTER
-     New pt packet - thanks      Made Luis an appointment for 5/11 @ 930 am with Dr. Francis     Added appointment to the wait list    Parking / wait time / insurance     Rachel Barroso Communication Facilitator on 3/22/2023 at 9:47 AM

## 2023-03-24 LAB — VIT B1 PYROPHOSHATE BLD-SCNC: 103 NMOL/L

## 2023-04-04 ENCOUNTER — OFFICE VISIT (OUTPATIENT)
Dept: OPHTHALMOLOGY | Facility: CLINIC | Age: 68
End: 2023-04-04
Attending: OPHTHALMOLOGY
Payer: COMMERCIAL

## 2023-04-04 DIAGNOSIS — H50.32 INTERMITTENT ESOTROPIA, ALTERNATING: ICD-10-CM

## 2023-04-04 DIAGNOSIS — H50.21 HYPOTROPIA OF RIGHT EYE: ICD-10-CM

## 2023-04-04 DIAGNOSIS — H49.22 SIXTH CRANIAL NERVE PALSY, LEFT: Primary | ICD-10-CM

## 2023-04-04 DIAGNOSIS — H53.10 SUBJECTIVE VISUAL DISTURBANCE: Primary | ICD-10-CM

## 2023-04-04 PROCEDURE — 92060 SENSORIMOTOR EXAMINATION: CPT | Mod: 26 | Performed by: OPHTHALMOLOGY

## 2023-04-04 PROCEDURE — 999N000103 HC STATISTIC NO CHARGE FACILITY FEE

## 2023-04-04 PROCEDURE — 92060 SENSORIMOTOR EXAMINATION: CPT | Performed by: OPHTHALMOLOGY

## 2023-04-04 PROCEDURE — 99204 OFFICE O/P NEW MOD 45 MIN: CPT | Mod: GC | Performed by: OPHTHALMOLOGY

## 2023-04-04 PROCEDURE — G0463 HOSPITAL OUTPT CLINIC VISIT: HCPCS | Performed by: OPHTHALMOLOGY

## 2023-04-04 PROCEDURE — V2718 FRESNELL PRISM PRESS-ON LENS: HCPCS

## 2023-04-04 ASSESSMENT — EXTERNAL EXAM - RIGHT EYE: OD_EXAM: NORMAL

## 2023-04-04 ASSESSMENT — CONF VISUAL FIELD
OD_NORMAL: 1
OS_SUPERIOR_TEMPORAL_RESTRICTION: 0
OS_NORMAL: 1
OD_SUPERIOR_NASAL_RESTRICTION: 0
OD_INFERIOR_NASAL_RESTRICTION: 0
OS_INFERIOR_TEMPORAL_RESTRICTION: 0
OD_INFERIOR_TEMPORAL_RESTRICTION: 0
OS_SUPERIOR_NASAL_RESTRICTION: 0
OD_SUPERIOR_TEMPORAL_RESTRICTION: 0
METHOD: COUNTING FINGERS
OS_INFERIOR_NASAL_RESTRICTION: 0

## 2023-04-04 ASSESSMENT — REFRACTION_WEARINGRX
OS_SPHERE: -5.00
OS_ADD: +2.50
SPECS_TYPE: PAL
OS_CYLINDER: +2.00
OD_CYLINDER: +2.00
OS_AXIS: 075
OD_SPHERE: -4.75
OD_ADD: +2.50
OD_AXIS: 110

## 2023-04-04 ASSESSMENT — EXTERNAL EXAM - LEFT EYE: OS_EXAM: NORMAL

## 2023-04-04 ASSESSMENT — TONOMETRY
OD_IOP_MMHG: 10
OS_IOP_MMHG: 11
IOP_METHOD: ICARE

## 2023-04-04 ASSESSMENT — VISUAL ACUITY
OS_CC: 20/20
OS_CC+: -1
OD_CC+: -2
OD_CC: 20/20
METHOD: SNELLEN - LINEAR
CORRECTION_TYPE: GLASSES

## 2023-04-04 ASSESSMENT — SLIT LAMP EXAM - LIDS: COMMENTS: NORMAL

## 2023-04-04 ASSESSMENT — CUP TO DISC RATIO
OD_RATIO: 0.55
OS_RATIO: 0.55

## 2023-04-04 NOTE — LETTER
2023         RE:  :  MRN: Luis Azevedo  1955  [id number]     Dear Dr. Finney,    Thank you for asking me to see your very pleasant patient, Luis Azevedo, in neuro-ophthalmic consultation.  I would like to thank you for sending your records and I have summarized them in the history of present illness.  My assessment and plan are below.  For further details, please see my attached clinic note.      Luis Azevedo is a 67 year old male with the following diagnoses:   1. Sixth cranial nerve palsy, left    2. Intermittent esotropia, alternating    3. Hypotropia of right eye         Patient was sent for consultation by Dr. Finney for suspected CN 3 palsy.    HPI:    Patient reports that beginning on 3/19/23, he noticed horizontal double vision while driving.  He denies any lid drooping at the time of onset.  Double vision was relieved with closing either eye.  The following morning, he went to an urgent care with concern for stroke.  He had a CT done which was unrevealing.  He was sent to the ER where he underwent unrevealing MRI Brain WWO + MRA Head on 3/19/23, although scattered foci of T2/FLAIR hyperintensity were consistent with chronic microvascular ischemic disease.     He had an appointment with neurology on 3/21/23, which thus far has noted mildly elevated A1c. ESR/CRP were within normal limits.    On 3/23/23 he saw an optometrist who diagnosed a CN 6 palsy and applied 25 ASIM Fresnel prism over the right eye.  He reports this was largely helpful, but there was still some separation between the images.    Independent historians:  Patient    Review of outside testing:    3/21/23 -- A1c 6.5, ACE <10, ANCA negative, Lyme negative, thiamine 103, B12 476    3/19/23 -- ESR 30, CRP 0.3, SELINA negative    3/19/23 -- MRI Brain WWO + MRA Brain    IMPRESSION:  HEAD MRI:   1.  No acute intracranial finding.  2.  Mild presumed chronic microvascular ischemic change.     HEAD MRA:   1.  Apparent moderate  short-segment stenosis involving a proximal M2 segment inferior division branch of the left MCA, favored to be artifactual.  2.  No additional high-grade stenosis. No large vessel occlusion or aneurysm.    My interpretation performed today of outside testing:  I have independently reviewed MRI Brain WWO performed 3/19/13. The orbits, skull base, and brainstem appear unremarkable.      Review of outside clinical notes:    3/21/23 -- Visit with Dr. Finney    67 year old male with new onset of horizontal binocular double vision.  Exam shows increased double vision on the left side.  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  We discussed prognosis/etiologies of this kind of neuropathy.  MRI brain was negative for structural lesions.  Blood work overall has been noncontributory except for elevated ESR.  He does have history of hypertension and diabetes.  Most likely this is ischemic in nature.  Discussed prognosis.  We will check blood work to look for other etiologies of the double vision.  We will also refer him to optometry/ophthalmologist to see if they can identify an exact cause for the double vision.  He should discuss ocular rehab with them.  Also should get prisms in his glasses.    Past medical history:  T2DM, HTN, high cholesterol, sleep disorder, sleep apnea      Medications:   diclofenac  lisinopril-hydrochlorothiazide  metFORMIN  rosuvastatin      Family history / social history:  Patient's family history is remarkable for stroke (maternal grandfather), HTN/T2DM (father), bile duct cancer (maternal grandfather).    Patient  reports that he has never smoked. He has never used smokeless tobacco. He reports current alcohol use.       Exam:  Corrected distance visual acuity was 20/20 -2 in the right eye and 20/20 -1 in the left eye. Intraocular pressure was 10 in the right eye and 11 in the left eye using ICare.  Color vision 11/11 right eye and 11/11 left eye.  Pupils isocoric with no  APD.      Anterior segment and fundus exam revealed normal age related changes.      Sensorimotor exam revealed a 25 pd esotropia and 2 pd R hypotropia in primary gaze, worse on left gaze.  Motility was mostly full with the exception of a -2 abduction deficit of the left eye.    Facial sensation intact and symmetric.    Assessment/Plan:   It is my impression that this patient has a left cranial nerve 6 palsy.  This appears neurologically isolated and he denies a history of cancer.  This is likely of microvascular etiology; he has had an extensive work up including unrevealing MRI/MRA.  Lab testing revealed a slightly elevated A1c.  I discussed his risk factors including type 2 diabetes and hypertension.  I applied a tilted 25 pd ASIM Fresnel over the right eye.  I counseled on the typical course of recovery.  He should follow-up with me in 2 months or sooner with any changes.        Again, thank you for allowing me to participate in the care of your patient.      Sincerely,    Cuauhtemoc Francis MD  Professor  Ophthalmology Residency   Director of Neuro-Ophthalmology  [employer] [facility] [employer] Endowed Chair  Departments of Ophthalmology, Neurology, and Neurosurgery  [address]  [address]  T - [phone]  F - [phone]  [email or web address]      CC: Amando Finney MD  [address]  [facility] [address]  Via In Basket    DX = 6th nerve palsy

## 2023-04-04 NOTE — NURSING NOTE
Chief Complaint(s) and History of Present Illness(es)     Diplopia Evaluation    In both eyes.           Comments    Luis Azevedo is a 67 year old male sent for consultation by Dr. Finney for diplopia evaluation.    Patient saw his optometrist for his annual diabetic exam on 02/08/23, was given new glasses.  A few weeks later, he noticed sudden onset of double vision.  Was seen in [place] ED 03/19/23.  He had CT scans, MRI, MRA and stroke work-up done -- all results were inconclusive.  He went back to see an optometrist in [place] two weeks ago for prism, a Fresnel 25pd base out was placed over his right eye.  He says it has been helping with his double vision but he is still noticing residual vertical diplopia.  He is type 2 DM, last BSL this morning was 119 and A1C 6.6.     HOLLY Pereira 4/4/2023 12:55 PM

## 2023-04-04 NOTE — PATIENT INSTRUCTIONS
Fresnel Prism       What changes will you notice with the Fresnel prism?   The prism will help you have single vision when looking straight ahead but still may have double vision when looking in different directions.  Try moving your head to look in different directions rather than just moving your eyes.     The prism may cause slight blurring or distortion of your vision.  Some may notice a slight rainbow effect when looking at lights.      Cleaning glasses with Fresnel prism applied   - Rinse glasses with gentle stream of warm water or submerge glasses in bowl of warm water.  Taking care to hold prism in place while wet.     - A lotion-free liquid dish detergent may be used to remove grease or other material.  Avoid using any glasses cleaning products that contain alcohol as this can cause the prism to go become cloudy.    - Pat dry with a soft lint-free cloth.     - If any dust, makeup, dirt, etc. remain trapped in the ridges of the prism, can scrub with a soft brush like a toothbrush (a baby toothbrush works great) in the same directions of the lines and then rinse again with warm water.      To reapply the Fresnel prism   - Run the lens under warm water or submerge in a bowl of warm water.  Slide the Fresnel prism into place with the smooth surface touching the glasses lens making sure it is on the the same eye that it was placed originally.  Make sure that the prism does not touch or overlap the edge of the lens or glasses frame anywhere.    - Press down gently and squeeze out any large air bubbles   - Pat or blot dry with a soft lint-free cloth   - Handle with care for a few hours after placement as the prism may move until the prism and lens are completely dry

## 2023-04-04 NOTE — PROGRESS NOTES
Luis Azevedo is a 67 year old male with the following diagnoses:   1. Sixth cranial nerve palsy, left    2. Intermittent esotropia, alternating    3. Hypotropia of right eye         Patient was sent for consultation by Dr. Finney for suspected CN 3 palsy.    HPI:    Patient reports that beginning on 3/19/23, he noticed horizontal double vision while driving.  He denies any lid drooping at the time of onset.  Double vision was relieved with closing either eye.  The following morning, he went to an urgent care with concern for stroke.  He had a CT done which was unrevealing.  He was sent to the ER where he underwent unrevealing MRI Brain WWO + MRA Head on 3/19/23, although scattered foci of T2/FLAIR hyperintensity were consistent with chronic microvascular ischemic disease.     He had an appointment with neurology on 3/21/23, which thus far has noted mildly elevated A1c. ESR/CRP were within normal limits.    On 3/23/23 he saw an optometrist who diagnosed a CN 6 palsy and applied 25 ASIM Fresnel prism over the right eye.  He reports this was largely helpful, but there was still some separation between the images.    Independent historians:  Patient    Review of outside testing:    3/21/23 -- A1c 6.5, ACE <10, ANCA negative, Lyme negative, thiamine 103, B12 476    3/19/23 -- ESR 30, CRP 0.3, SELINA negative    3/19/23 -- MRI Brain WWO + MRA Brain    IMPRESSION:  HEAD MRI:   1.  No acute intracranial finding.  2.  Mild presumed chronic microvascular ischemic change.     HEAD MRA:   1.  Apparent moderate short-segment stenosis involving a proximal M2 segment inferior division branch of the left MCA, favored to be artifactual.  2.  No additional high-grade stenosis. No large vessel occlusion or aneurysm.    My interpretation performed today of outside testing:  I have independently reviewed MRI Brain WWO performed 3/19/13. The orbits, skull base, and brainstem appear unremarkable.      Review of outside clinical  notes:    3/21/23 -- Visit with Dr. Finney    67 year old male with new onset of horizontal binocular double vision.  Exam shows increased double vision on the left side.  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  We discussed prognosis/etiologies of this kind of neuropathy.  MRI brain was negative for structural lesions.  Blood work overall has been noncontributory except for elevated ESR.  He does have history of hypertension and diabetes.  Most likely this is ischemic in nature.  Discussed prognosis.  We will check blood work to look for other etiologies of the double vision.  We will also refer him to optometry/ophthalmologist to see if they can identify an exact cause for the double vision.  He should discuss ocular rehab with them.  Also should get prisms in his glasses.    Past medical history:  T2DM, HTN, high cholesterol, sleep disorder, sleep apnea      Medications:   diclofenac  lisinopril-hydrochlorothiazide  metFORMIN  rosuvastatin      Family history / social history:  Patient's family history is remarkable for stroke (maternal grandfather), HTN/T2DM (father), bile duct cancer (maternal grandfather).    Patient  reports that he has never smoked. He has never used smokeless tobacco. He reports current alcohol use.       Exam:  Corrected distance visual acuity was 20/20 -2 in the right eye and 20/20 -1 in the left eye. Intraocular pressure was 10 in the right eye and 11 in the left eye using ICare.  Color vision 11/11 right eye and 11/11 left eye.  Pupils isocoric with no APD.      Anterior segment and fundus exam revealed normal age related changes.      Sensorimotor exam revealed a 25 pd esotropia and 2 pd R hypotropia in primary gaze, worse on left gaze.  Motility was mostly full with the exception of a -2 abduction deficit of the left eye.    Facial sensation intact and symmetric.    Assessment/Plan:   It is my impression that this patient has a left cranial nerve 6 palsy.   This appears neurologically isolated and he denies a history of cancer.  This is likely of microvascular etiology; he has had an extensive work up including unrevealing MRI/MRA.  Lab testing revealed a slightly elevated A1c.  I discussed his risk factors including type 2 diabetes and hypertension.  I applied a tilted 25 pd ASIM Fresnel over the right eye.  I counseled on the typical course of recovery.  He should follow-up with me in 2 months or sooner with any changes.         Attending Physician Attestation:  Complete documentation of historical and exam elements from today's encounter can be found in the full encounter summary report (not reduplicated in this progress note).  I personally obtained the chief complaint(s) and history of present illness.  I confirmed and edited as necessary the review of systems, past medical/surgical history, family history, social history, and examination findings as documented by others; and I examined the patient myself.  I personally reviewed the relevant tests, images, and reports as documented above.  I formulated and edited as necessary the assessment and plan and discussed the findings and management plan with the patient and family. - Cuauhtemoc Francis MD      Precharting:  Ashu Beebe MD PhD  Fellow, Neuro-Ophthalmology    Celina Patiño, SALEEM

## 2023-05-18 ENCOUNTER — OFFICE VISIT (OUTPATIENT)
Dept: OPHTHALMOLOGY | Facility: CLINIC | Age: 68
End: 2023-05-18
Attending: OPHTHALMOLOGY
Payer: COMMERCIAL

## 2023-05-18 DIAGNOSIS — H53.10 SUBJECTIVE VISUAL DISTURBANCE: Primary | ICD-10-CM

## 2023-05-18 DIAGNOSIS — H49.20 6TH NERVE PALSY, UNSPECIFIED LATERALITY: ICD-10-CM

## 2023-05-18 DIAGNOSIS — H50.05 ALTERNATING ESOTROPIA: Primary | ICD-10-CM

## 2023-05-18 DIAGNOSIS — H50.21 HYPOTROPIA OF RIGHT EYE: ICD-10-CM

## 2023-05-18 PROCEDURE — G0463 HOSPITAL OUTPT CLINIC VISIT: HCPCS | Performed by: OPHTHALMOLOGY

## 2023-05-18 PROCEDURE — 99213 OFFICE O/P EST LOW 20 MIN: CPT | Performed by: OPHTHALMOLOGY

## 2023-05-18 PROCEDURE — 92060 SENSORIMOTOR EXAMINATION: CPT | Performed by: OPHTHALMOLOGY

## 2023-05-18 PROCEDURE — 92060 SENSORIMOTOR EXAMINATION: CPT | Mod: 26 | Performed by: OPHTHALMOLOGY

## 2023-05-18 ASSESSMENT — REFRACTION_WEARINGRX
OS_CYLINDER: +2.00
OS_SPHERE: -5.00
OD_ADD: +2.50
OD_CYLINDER: +2.00
SPECS_TYPE: PAL
OS_AXIS: 075
OD_AXIS: 110
OD_SPHERE: -4.75
OS_ADD: +2.50

## 2023-05-18 ASSESSMENT — VISUAL ACUITY
CORRECTION_TYPE: GLASSES
OS_CC+: -1
METHOD: SNELLEN - LINEAR
OD_CC+: -1
OD_CC: 20/20
OS_CC: 20/20

## 2023-05-18 ASSESSMENT — CUP TO DISC RATIO
OS_RATIO: 0.65
OD_RATIO: 0.55

## 2023-05-18 ASSESSMENT — SLIT LAMP EXAM - LIDS: COMMENTS: NORMAL

## 2023-05-18 ASSESSMENT — CONF VISUAL FIELD
OD_SUPERIOR_TEMPORAL_RESTRICTION: 0
OD_SUPERIOR_NASAL_RESTRICTION: 0
OD_INFERIOR_NASAL_RESTRICTION: 0
OD_NORMAL: 1
OD_INFERIOR_TEMPORAL_RESTRICTION: 0
OS_INFERIOR_TEMPORAL_RESTRICTION: 0
OS_SUPERIOR_TEMPORAL_RESTRICTION: 0
OS_SUPERIOR_NASAL_RESTRICTION: 0
METHOD: COUNTING FINGERS
OS_NORMAL: 1
OS_INFERIOR_NASAL_RESTRICTION: 0

## 2023-05-18 ASSESSMENT — TONOMETRY
OS_IOP_MMHG: 08
OD_IOP_MMHG: 11
IOP_METHOD: ICARE

## 2023-05-18 ASSESSMENT — EXTERNAL EXAM - RIGHT EYE: OD_EXAM: NORMAL

## 2023-05-18 ASSESSMENT — EXTERNAL EXAM - LEFT EYE: OS_EXAM: NORMAL

## 2023-05-18 NOTE — PROGRESS NOTES
Luis Azevedo is a 67 year old male with the following diagnoses:   1. Alternating esotropia    2. Hypotropia of right eye    3. 6th nerve palsy, unspecified laterality         Patient was last seen 4/4//23 for follow up of left sixth nerve palsy.  I felt this was most likely microvascular given unremarkable neuroimaging and labs aside from a slightly elevated hemoglobin A1c.  I gave him a 25 base out prism over the right eye.      Since then he feels the double vision remains about the same, although he does not have any diplopia with the Fresnel in place. He denies other vision changes and pain in the eyes. No headaches.    Corrected distance visual acuity was 20/20 -1 in the right eye and 20/20 -1 in the left eye. Intraocular pressure was 11 in the right eye and 08 in the left eye using ICare.  Color vision 11/11 right eye and 11/11 left eye.  Pupils isocoric.  Anterior segment exam stable with arcus.  Fundus exam stable with mild cupping OU. Strabismus exam with improved ET (25-->20) and modest RHoT.    We discussed the natural history of CN 6 palsy. I am encouraged there has been some improvement and hope he will continue to improve over the next few months. Decreased Fresnel from 25 to 20 ASIM with tilt for RHoT. Return in 2 months, sooner as needed.            Attending Physician Attestation:  Complete documentation of historical and exam elements from today's encounter can be found in the full encounter summary report (not reduplicated in this progress note).  I personally obtained the chief complaint(s) and history of present illness.  I confirmed and edited as necessary the review of systems, past medical/surgical history, family history, social history, and examination findings as documented by others; and I examined the patient myself.  I personally reviewed the relevant tests, images, and reports as documented above.  I formulated and edited as necessary the assessment and plan and discussed the  findings and management plan with the patient and family. I personally reviewed the ophthalmic test(s) associated with this encounter, agree with the interpretation(s) as documented by the resident/fellow, and have edited the corresponding report(s) as necessary.  - Cuauhtemoc Beebe MD PhD  Fellow, Neuro-Ophthalmology

## 2023-05-21 ENCOUNTER — HEALTH MAINTENANCE LETTER (OUTPATIENT)
Age: 68
End: 2023-05-21

## 2023-06-07 ENCOUNTER — OFFICE VISIT (OUTPATIENT)
Dept: NEUROLOGY | Facility: CLINIC | Age: 68
End: 2023-06-07
Payer: COMMERCIAL

## 2023-06-07 VITALS
BODY MASS INDEX: 34.15 KG/M2 | SYSTOLIC BLOOD PRESSURE: 112 MMHG | DIASTOLIC BLOOD PRESSURE: 68 MMHG | RESPIRATION RATE: 18 BRPM | HEART RATE: 72 BPM | WEIGHT: 238 LBS

## 2023-06-07 DIAGNOSIS — Z86.79 HX OF ESSENTIAL HYPERTENSION: ICD-10-CM

## 2023-06-07 DIAGNOSIS — H49.22 CN VI PALSY, LEFT EYE: ICD-10-CM

## 2023-06-07 DIAGNOSIS — H53.2 DIPLOPIA: Primary | ICD-10-CM

## 2023-06-07 DIAGNOSIS — E11.49 DIABETES MELLITUS TYPE 2 WITH NEUROLOGICAL MANIFESTATIONS (H): ICD-10-CM

## 2023-06-07 DIAGNOSIS — H81.10 BENIGN PAROXYSMAL POSITIONAL VERTIGO, UNSPECIFIED LATERALITY: ICD-10-CM

## 2023-06-07 PROCEDURE — 99214 OFFICE O/P EST MOD 30 MIN: CPT | Performed by: PSYCHIATRY & NEUROLOGY

## 2023-06-07 NOTE — LETTER
6/7/2023         RE: Luis Azevedo  [address] [address]        Dear Colleague,    Thank you for referring your patient, Luis Azevedo, to the [facility] [place]. Please see a copy of my visit note below.    NEUROLOGY OUTPATIENT PROGRESS NOTE  Jun 7, 2023     CHIEF COMPLAINT/REASON FOR VISIT/REASON FOR CONSULT  Patient presents with:  Follow Up    REASON FOR CONSULTATION- Diplopia    REFERRAL SOURCE  Dr. Cartwright  CC Dr. Cartwright    HISTORY OF PRESENT ILLNESS  Luis Azevedo is a 67 year old male seen today for evaluation of double vision.  Double vision came on about a week ago.  This happened while he was driving.  There was no provoking factors.  Denies any major headaches denies any neck pain.  Given the vision loss there is no other neurological symptoms.  No associated chest pains or palpitations.  Does have a history of hypertension though it is under good control.  His diabetes is also under good control.  A1c has been 6.6.  He did present to the emergency room and an MRI of the brain was done which was negative for any structural lesions.  MRA was negative for aneurysms.  ESR was checked which was slightly elevated.  This chronically does run high for him.  Does report mild improvement in his symptoms though no major worsening.  Has not been able to see the eye doctor.  Does have a peripheral neuropathy due to diabetes.  Has not had similar symptoms in the past.    Double vision is horizontal.  Closing 1 eye the double vision does go away.  Does complain more of it towards the left side.    6/7/23  Patient reports improvement in his diplopia.  Has seen Dr. Francis at the [facility].  Is getting a prism of his left eye.  He was thought to have a left cranial nerve VI palsy.  No other new symptoms.  Does have a diabetic neuropathy.  This does affect his balance.  Occasionally when he turns his head too fast he will feel vertigo.  Discussed about benign  positional paroxysmal vertigo and wants to hold off on therapy.    Previous history is reviewed and this is unchanged.    PAST MEDICAL/SURGICAL HISTORY  History reviewed. No pertinent past medical history.  Patient Active Problem List   Diagnosis     Alternating esotropia     Hypotropia of right eye   Significant diabetes, high blood pressure, high cholesterol, sleep disorder, sleep apnea.  Consider sleep    FAMILY HISTORY  History reviewed. No pertinent family history.   Family history positive for stroke in his grandfather.    SOCIAL HISTORY  Social History     Tobacco Use     Smoking status: Never     Smokeless tobacco: Never   Substance Use Topics     Alcohol use: Yes     Comment: minimal       SYSTEMS REVIEW  Twelve-system ROS was done and other than the HPI this was negative except vision symptoms.  No new symptoms/issues.    MEDICATIONS  diclofenac (VOLTAREN) 75 MG EC tablet, TAKE 1 TABLET (75 MG) BY MOUTH TWICE A DAY  lisinopril-hydrochlorothiazide (ZESTORETIC) 20-25 MG tablet, Take 1 tablet by mouth daily  metFORMIN (GLUCOPHAGE) 500 MG tablet, TAKE 1 TABLET (500 MG) BY MOUTH TWICE A DAY WITH MEALS  rosuvastatin (CRESTOR) 5 MG tablet, Take 5 mg by mouth    No current facility-administered medications on file prior to visit.       PHYSICAL EXAMINATION  VITALS: /68   Pulse 72   Resp 18   Wt 108 kg (238 lb)   BMI 34.15 kg/m    GENERAL: Healthy appearing, alert, no acute distress, normal habitus.  CARDIOVASCULAR: Extremities warm and well perfused. Pulses present.   NEUROLOGICAL:  Patient is awake and oriented to self, place and time.  Attention span is normal.  Memory is grossly intact.  Language is fluent and follows commands appropriately.  Appropriate fund of knowledge. Cranial nerves 2-12 are intact.  Has prisms on-right side.  There is no pronator drift.  Motor exam shows 5/5 strength in all extremities.  Tone is symmetric bilaterally in upper and lower extremities.  (Previously reflexes are  symmetric and absent in upper extremities and lower extremities. Sensory exam is grossly intact to light touch, pin prick and vibration.)  Finger to nose and heel to shin is without dysmetria.  Romberg is negative.  Gait is normal and the patient is able to do tandem walk and walk on toes and heels with mild difficulty.  Exam stable/improved compared to before.    DIAGNOSTICS  MRI-images reviewed.  No acute structural lesion noted.  HEAD MRI:   1.  No acute intracranial finding.  2.  Mild presumed chronic microvascular ischemic change.     HEAD MRA:   1.  Apparent moderate short-segment stenosis involving a proximal M2 segment inferior division branch of the left MCA, favored to be artifactual.  2.  No additional high-grade stenosis. No large vessel occlusion or aneurysm.    RELEVANT LABS  Component      Latest Ref Rng & Units 3/19/2023   WBC      4.0 - 11.0 10e3/uL 7.5   RBC Count      4.40 - 5.90 10e6/uL 5.12   Hemoglobin      13.3 - 17.7 g/dL 14.4   Hematocrit      40.0 - 53.0 % 43.0   MCV      78 - 100 fL 84   MCH      26.5 - 33.0 pg 28.1   MCHC      31.5 - 36.5 g/dL 33.5   RDW      10.0 - 15.0 % 13.5   Platelet Count      150 - 450 10e3/uL 227   % Neutrophils      % 57   % Lymphocytes      % 34   % Monocytes      % 7   % Eosinophils      % 2   % Basophils      % 0   % Immature Granulocytes      % 0   NRBCs per 100 WBC      <1 /100 0   Absolute Neutrophils      1.6 - 8.3 10e3/uL 4.2   Absolute Lymphocytes      0.8 - 5.3 10e3/uL 2.5   Absolute Monocytes      0.0 - 1.3 10e3/uL 0.5   Absolute Eosinophils      0.0 - 0.7 10e3/uL 0.1   Absolute Basophils      0.0 - 0.2 10e3/uL 0.0   Absolute Immature Granulocytes      <=0.4 10e3/uL 0.0   Absolute NRBCs      10e3/uL 0.0   Sodium      136 - 145 mmol/L 141   Potassium      3.5 - 5.0 mmol/L 3.5   Chloride      98 - 107 mmol/L 105   Carbon Dioxide      22 - 31 mmol/L 22   Anion Gap      5 - 18 mmol/L 14   Urea Nitrogen      8 - 22 mg/dL 17   Creatinine      0.70 - 1.30  mg/dL 0.78   Calcium      8.5 - 10.5 mg/dL 9.4   Glucose      70 - 125 mg/dL 109   GFR Estimate      >60 mL/min/1.73m2 >90   Sed Rate      0 - 15 mm/hr 30 (H)   SELINA interpretation      Negative Negative   CRP      0.0 - <0.8 mg/dL 0.3         OUTSIDE RECORDS  Outside referral notes and chart notes were reviewed and pertinent information has been summarized (in addition to the HPI):-        Component      Latest Ref Rng 3/21/2023  1:42 PM   Neutrophil Cytoplasmic Antibody      <1:10  <1:10    Neutrophil Cytoplasmic Antibody Pattern The ANCA IFA is <1:10. No further testing will be performed.    Hemoglobin A1C      <5.7 % 6.5 (H)    Angiotensin Converting Enzyme      16 - 85 U/L <10 (L)    SELINA interpretation      Negative  Negative    Lyme Disease Antibodies Serum      <0.90  0.13    TSH      0.30 - 5.00 uIU/mL 1.85    Vitamin B1 Whole Blood Level      70 - 180 nmol/L 103    Vitamin B12      232 - 1,245 pg/mL 476       Legend:  (H) High  (L) Low    IMPRESSION/REPORT/PLAN  Diplopia  Left cranial nerve VI palsy  History of diabetes and hypertension  History of positive SELINA  Elevated ESR  BPPV    This is a 67 year old male with new onset of horizontal binocular double vision.  Exam shows increased double vision on the left side.  He has been diagnosed to have a left cranial nerve VI palsy.  He is slowly making improvement.  Suspect this to be microvascular in nature from his diabetes and hypertension.  MRI brain was negative for structural lesions.  ESR was elevated.  Blood work was otherwise noncontributory.  Would recommend continuing follow-up with Dr. Francis for prism adjustment.  Discussed prognosis.    He further complains of vertigo with head movement.  Suggestive of BPPV.  Currently this is not too bothersome though if he would like he could see a physical therapist for vestibular rehab.     Recommend continuing aggressive control of his diabetes and hypertension.  Vascular risk factor management through primary  care.  Aspirin would be optional as it is generally not standard of care for microvascular cranial neuropathies.  Exercise and healthy lifestyle.    Return on as-needed basis.    Return if symptoms worsen or fail to improve, for In-Clinic Visit (must), Return to PCP or referring provider.    Over 30 minutes were spent coordinating the care for the patient on the day of the encounter.  This includes previsit, during visit and post visit activities as documented above.  Counseling patient.  Reviewing chart/ophthalmology notes.  Blood work reviewed.  Multiple problems addressed.  (Activities include but not inclusive of reviewing chart, reviewing outside records, reviewing labs and imaging study results as well as the images, patient visit time including getting history and exam,  use if applicable, review of test results with the patient and coming up with a plan in a shared model, counseling patient and family, education and answering patient questions, EMR , EMR diagnosis entry and problem list management, medication reconciliation and prescription management if applicable, paperwork if applicable, printing documents and documentation of the visit activities.)        Amando Finney MD  Neurologist  [facility]  Tel:- [phone]    This note was dictated using voice recognition software.  Any grammatical or context distortions are unintentional and inherent to the software.        Again, thank you for allowing me to participate in the care of your patient.        Sincerely,        Amando Finney MD

## 2023-07-20 ENCOUNTER — OFFICE VISIT (OUTPATIENT)
Dept: OPHTHALMOLOGY | Facility: CLINIC | Age: 68
End: 2023-07-20
Attending: OPHTHALMOLOGY
Payer: COMMERCIAL

## 2023-07-20 DIAGNOSIS — H53.10 SUBJECTIVE VISUAL DISTURBANCE: Primary | ICD-10-CM

## 2023-07-20 DIAGNOSIS — H53.2 DOUBLE VISION: ICD-10-CM

## 2023-07-20 DIAGNOSIS — H49.22 SIXTH CRANIAL NERVE PALSY, LEFT: Primary | ICD-10-CM

## 2023-07-20 DIAGNOSIS — H50.05 ALTERNATING ESOTROPIA: ICD-10-CM

## 2023-07-20 PROCEDURE — 92060 SENSORIMOTOR EXAMINATION: CPT | Performed by: OPHTHALMOLOGY

## 2023-07-20 PROCEDURE — G0463 HOSPITAL OUTPT CLINIC VISIT: HCPCS | Performed by: OPHTHALMOLOGY

## 2023-07-20 PROCEDURE — 99213 OFFICE O/P EST LOW 20 MIN: CPT | Performed by: OPHTHALMOLOGY

## 2023-07-20 ASSESSMENT — CONF VISUAL FIELD
OS_INFERIOR_NASAL_RESTRICTION: 0
OD_SUPERIOR_NASAL_RESTRICTION: 0
OD_INFERIOR_NASAL_RESTRICTION: 0
OS_SUPERIOR_TEMPORAL_RESTRICTION: 0
OD_NORMAL: 1
OD_SUPERIOR_TEMPORAL_RESTRICTION: 0
METHOD: TOYS
OD_INFERIOR_TEMPORAL_RESTRICTION: 0
OS_NORMAL: 1
OS_INFERIOR_TEMPORAL_RESTRICTION: 0
OS_SUPERIOR_NASAL_RESTRICTION: 0

## 2023-07-20 ASSESSMENT — TONOMETRY
OD_IOP_MMHG: 14
OS_IOP_MMHG: 14
IOP_METHOD: ICARE

## 2023-07-20 ASSESSMENT — VISUAL ACUITY
METHOD: SNELLEN - LINEAR
OS_CC: 20/20
OD_CC: 20/20
CORRECTION_TYPE: GLASSES
OS_CC+: -1

## 2023-07-20 ASSESSMENT — REFRACTION_WEARINGRX
OD_SPHERE: -4.75
OS_SPHERE: -5.00
OD_CYLINDER: +2.00
SPECS_TYPE: PAL
OD_ADD: +2.50
OD_AXIS: 110
OS_CYLINDER: +2.00
OS_AXIS: 075
OS_ADD: +2.50

## 2023-07-20 ASSESSMENT — EXTERNAL EXAM - LEFT EYE: OS_EXAM: NORMAL

## 2023-07-20 ASSESSMENT — CUP TO DISC RATIO
OS_RATIO: 0.65
OD_RATIO: 0.55

## 2023-07-20 ASSESSMENT — SLIT LAMP EXAM - LIDS: COMMENTS: NORMAL

## 2023-07-20 ASSESSMENT — EXTERNAL EXAM - RIGHT EYE: OD_EXAM: NORMAL

## 2023-07-20 NOTE — PROGRESS NOTES
Assessment & Plan     Luis Azevedo is a 68 year old male with the following diagnoses:   1. Sixth cranial nerve palsy, left    2. Alternating esotropia    3. Double vision       Patient was last seen 5/18/2023 for follow up of left sixth nerve palsy.  At the time of last visit he was mildly improved.  Gave him a 20 base out tilted Fresnel prism.    Today, he feels that his vision is about the same as his last visit.  He denies double vision while wearing glasses with Fresnel prism.  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  Closes his right eye to clear blur (eye with Fresnel).      Corrected distance visual acuity was 20/20 in the right eye and 20/20 -1 in the left eye. Intraocular pressure was 14 in the right eye and 14 in the left eye using ICare.  Color vision 11/11 right eye and 11/11 left eye.  Pupils isocoric with no APD.  Anterior segment and fundus exam stable to previous. Strabismus exam revealed improvement to 8-10 ET from 20 ET with small RHypo.    It is my impression that this patient has had continued improvement of his left cranial nerve 6 palsy. I reviewed the natural course of 6th nerve palsies.  Decreased Fresnel from 20 to 10 ASIM with tilt for RHoT. Return in 2 months, sooner as needed.           Attending Physician Attestation:  Complete documentation of historical and exam elements from today's encounter can be found in the full encounter summary report (not reduplicated in this progress note).  I personally obtained the chief complaint(s) and history of present illness.  I confirmed and edited as necessary the review of systems, past medical/surgical history, family history, social history, and examination findings as documented by others; and I examined the patient myself.  I personally reviewed the relevant tests, images, and reports as documented above.  I formulated and edited as necessary the assessment and plan and  discussed the findings and management plan with the patient and family. - Cuauhtemoc Patiño, OD

## 2023-08-13 ENCOUNTER — HEALTH MAINTENANCE LETTER (OUTPATIENT)
Age: 68
End: 2023-08-13

## 2023-09-21 ENCOUNTER — OFFICE VISIT (OUTPATIENT)
Dept: OPHTHALMOLOGY | Facility: CLINIC | Age: 68
End: 2023-09-21
Attending: OPHTHALMOLOGY
Payer: COMMERCIAL

## 2023-09-21 DIAGNOSIS — H53.2 DOUBLE VISION: ICD-10-CM

## 2023-09-21 DIAGNOSIS — H50.05 ALTERNATING ESOTROPIA: ICD-10-CM

## 2023-09-21 DIAGNOSIS — H53.10 SUBJECTIVE VISUAL DISTURBANCE: Primary | ICD-10-CM

## 2023-09-21 DIAGNOSIS — H49.22 SIXTH CRANIAL NERVE PALSY, LEFT: Primary | ICD-10-CM

## 2023-09-21 PROCEDURE — 99213 OFFICE O/P EST LOW 20 MIN: CPT | Mod: GC | Performed by: OPHTHALMOLOGY

## 2023-09-21 PROCEDURE — G0463 HOSPITAL OUTPT CLINIC VISIT: HCPCS | Performed by: OPHTHALMOLOGY

## 2023-09-21 PROCEDURE — 92060 SENSORIMOTOR EXAMINATION: CPT | Performed by: OPHTHALMOLOGY

## 2023-09-21 ASSESSMENT — CUP TO DISC RATIO
OS_RATIO: 0.65
OD_RATIO: 0.55

## 2023-09-21 ASSESSMENT — CONF VISUAL FIELD
OD_INFERIOR_NASAL_RESTRICTION: 0
OS_INFERIOR_NASAL_RESTRICTION: 0
OD_INFERIOR_TEMPORAL_RESTRICTION: 0
OS_INFERIOR_TEMPORAL_RESTRICTION: 0
OS_SUPERIOR_TEMPORAL_RESTRICTION: 0
METHOD: COUNTING FINGERS
OS_NORMAL: 1
OS_SUPERIOR_NASAL_RESTRICTION: 0
OD_SUPERIOR_NASAL_RESTRICTION: 0
OD_SUPERIOR_TEMPORAL_RESTRICTION: 0
OD_NORMAL: 1

## 2023-09-21 ASSESSMENT — VISUAL ACUITY
CORRECTION_TYPE: GLASSES
METHOD: SNELLEN - LINEAR
OD_CC+: -1
OD_CC: 20/20
OS_CC+: -2
OS_CC: 20/20

## 2023-09-21 ASSESSMENT — TONOMETRY
IOP_METHOD: ICARE
OS_IOP_MMHG: 09
OD_IOP_MMHG: 10

## 2023-09-21 ASSESSMENT — REFRACTION_WEARINGRX
OD_AXIS: 110
OD_SPHERE: -4.75
OD_CYLINDER: +2.00
OD_ADD: +2.50
OS_AXIS: 075
OS_ADD: +2.50
OS_CYLINDER: +2.00
OS_SPHERE: -5.00
SPECS_TYPE: PAL

## 2023-09-21 ASSESSMENT — EXTERNAL EXAM - LEFT EYE: OS_EXAM: NORMAL

## 2023-09-21 ASSESSMENT — SLIT LAMP EXAM - LIDS: COMMENTS: MGD

## 2023-09-21 ASSESSMENT — EXTERNAL EXAM - RIGHT EYE: OD_EXAM: NORMAL

## 2023-09-21 NOTE — PROGRESS NOTES
Assessment & Plan     Gagan Azevedo is a 68 year old male with the following diagnoses:   No diagnosis found.     Patient was last seen on 7/20/2023 for follow up of left sixth nerve palsy.  The last visit his left sixth nerve palsy was improving.  Decreased his fresnel prism from 20 to 10 base out TILTED Fresnel prism.      Today he reports that he took his Fresnel prism off a month ago and has not had a recurrence of his central diplopia since. Still notices some very mild visual abnormality when looking left but not gagan horizontal diplopia. The patient has normal afferent visual function in both eyes including normal best corrected visual acuity, color vision, confrontation visual fields, and pupillary light responses in both eyes. His sensorimotor exam had trace symmetric abduction each eye and ortho vision on all gazes.     At this time it appears that his microvascular cranial nerve VI palsy has essentially resolved. Discussed excellent control of blood sugar, pressure, and lipids going forward. He can follow up as needed.            Attending Physician Attestation:  Complete documentation of historical and exam elements from today's encounter can be found in the full encounter summary report (not reduplicated in this progress note).  I personally obtained the chief complaint(s) and history of present illness.  I confirmed and edited as necessary the review of systems, past medical/surgical history, family history, social history, and examination findings as documented by others; and I examined the patient myself.  I personally reviewed the relevant tests, images, and reports as documented above.  I formulated and edited as necessary the assessment and plan and discussed the findings and management plan with the patient and family. I personally reviewed the ophthalmic test(s) associated with this encounter, agree with the interpretation(s) as documented by the resident/fellow, and have edited the  corresponding report(s) as necessary.  - Cuauhtemoc Francis MD     Thank you for entrusting us with your care  Brisa Rodrigues MD, PGY3  Ophthalmology Resident  [facility]

## 2023-12-31 ENCOUNTER — HEALTH MAINTENANCE LETTER (OUTPATIENT)
Age: 68
End: 2023-12-31

## 2024-05-19 ENCOUNTER — HEALTH MAINTENANCE LETTER (OUTPATIENT)
Age: 69
End: 2024-05-19

## 2024-07-28 ENCOUNTER — HEALTH MAINTENANCE LETTER (OUTPATIENT)
Age: 69
End: 2024-07-28

## 2024-09-26 ENCOUNTER — APPOINTMENT (OUTPATIENT)
Dept: CT IMAGING | Facility: CLINIC | Age: 69
End: 2024-09-26
Attending: EMERGENCY MEDICINE
Payer: COMMERCIAL

## 2024-09-26 ENCOUNTER — HOSPITAL ENCOUNTER (EMERGENCY)
Facility: CLINIC | Age: 69
Discharge: HOME | End: 2024-09-26
Attending: EMERGENCY MEDICINE | Admitting: EMERGENCY MEDICINE
Payer: COMMERCIAL

## 2024-09-26 VITALS
DIASTOLIC BLOOD PRESSURE: 62 MMHG | WEIGHT: 215 LBS | HEART RATE: 92 BPM | BODY MASS INDEX: 30.78 KG/M2 | RESPIRATION RATE: 16 BRPM | OXYGEN SATURATION: 93 % | SYSTOLIC BLOOD PRESSURE: 116 MMHG | HEIGHT: 70 IN | TEMPERATURE: 98.3 F

## 2024-09-26 DIAGNOSIS — T50.Z95A ADVERSE EFFECT OF VACCINE, INITIAL ENCOUNTER: ICD-10-CM

## 2024-09-26 DIAGNOSIS — R07.9 CHEST PAIN, UNSPECIFIED TYPE: ICD-10-CM

## 2024-09-26 LAB
ALBUMIN SERPL BCG-MCNC: 4.5 G/DL (ref 3.5–5.2)
ALP SERPL-CCNC: 62 U/L (ref 40–150)
ALT SERPL W P-5'-P-CCNC: 22 U/L (ref 0–70)
ANION GAP SERPL CALCULATED.3IONS-SCNC: 15 MMOL/L (ref 7–15)
AST SERPL W P-5'-P-CCNC: 26 U/L (ref 0–45)
ATRIAL RATE - MUSE: 79 BPM
BASOPHILS # BLD AUTO: 0 10E3/UL (ref 0–0.2)
BASOPHILS NFR BLD AUTO: 0 %
BILIRUB SERPL-MCNC: 0.5 MG/DL
BUN SERPL-MCNC: 29.2 MG/DL (ref 8–23)
CALCIUM SERPL-MCNC: 10 MG/DL (ref 8.8–10.4)
CHLORIDE SERPL-SCNC: 102 MMOL/L (ref 98–107)
CREAT SERPL-MCNC: 1.32 MG/DL (ref 0.67–1.17)
DIASTOLIC BLOOD PRESSURE - MUSE: 87 MMHG
EGFRCR SERPLBLD CKD-EPI 2021: 58 ML/MIN/1.73M2
EOSINOPHIL # BLD AUTO: 0 10E3/UL (ref 0–0.7)
EOSINOPHIL NFR BLD AUTO: 1 %
ERYTHROCYTE [DISTWIDTH] IN BLOOD BY AUTOMATED COUNT: 17.2 % (ref 10–15)
GLUCOSE SERPL-MCNC: 155 MG/DL (ref 70–99)
HCO3 SERPL-SCNC: 21 MMOL/L (ref 22–29)
HCT VFR BLD AUTO: 34.8 % (ref 40–53)
HGB BLD-MCNC: 11.6 G/DL (ref 13.3–17.7)
HOLD SPECIMEN: NORMAL
IMM GRANULOCYTES # BLD: 0 10E3/UL
IMM GRANULOCYTES NFR BLD: 0 %
INTERPRETATION ECG - MUSE: NORMAL
LIPASE SERPL-CCNC: 56 U/L (ref 13–60)
LYMPHOCYTES # BLD AUTO: 0.4 10E3/UL (ref 0.8–5.3)
LYMPHOCYTES NFR BLD AUTO: 7 %
MCH RBC QN AUTO: 27.6 PG (ref 26.5–33)
MCHC RBC AUTO-ENTMCNC: 33.3 G/DL (ref 31.5–36.5)
MCV RBC AUTO: 83 FL (ref 78–100)
MONOCYTES # BLD AUTO: 0.5 10E3/UL (ref 0–1.3)
MONOCYTES NFR BLD AUTO: 9 %
NEUTROPHILS # BLD AUTO: 4.7 10E3/UL (ref 1.6–8.3)
NEUTROPHILS NFR BLD AUTO: 83 %
NRBC # BLD AUTO: 0 10E3/UL
NRBC BLD AUTO-RTO: 0 /100
P AXIS - MUSE: 44 DEGREES
PLATELET # BLD AUTO: 113 10E3/UL (ref 150–450)
POTASSIUM SERPL-SCNC: 4.1 MMOL/L (ref 3.4–5.3)
PR INTERVAL - MUSE: 170 MS
PROT SERPL-MCNC: 8.3 G/DL (ref 6.4–8.3)
QRS DURATION - MUSE: 88 MS
QT - MUSE: 348 MS
QTC - MUSE: 399 MS
R AXIS - MUSE: 2 DEGREES
RBC # BLD AUTO: 4.21 10E6/UL (ref 4.4–5.9)
SODIUM SERPL-SCNC: 138 MMOL/L (ref 135–145)
SYSTOLIC BLOOD PRESSURE - MUSE: 198 MMHG
T AXIS - MUSE: 43 DEGREES
TROPONIN T SERPL HS-MCNC: 24 NG/L
TROPONIN T SERPL HS-MCNC: 26 NG/L
VENTRICULAR RATE- MUSE: 79 BPM
WBC # BLD AUTO: 5.6 10E3/UL (ref 4–11)

## 2024-09-26 PROCEDURE — 250N000013 HC RX MED GY IP 250 OP 250 PS 637: Performed by: EMERGENCY MEDICINE

## 2024-09-26 PROCEDURE — 36415 COLL VENOUS BLD VENIPUNCTURE: CPT | Performed by: EMERGENCY MEDICINE

## 2024-09-26 PROCEDURE — 71275 CT ANGIOGRAPHY CHEST: CPT

## 2024-09-26 PROCEDURE — 96375 TX/PRO/DX INJ NEW DRUG ADDON: CPT

## 2024-09-26 PROCEDURE — 96374 THER/PROPH/DIAG INJ IV PUSH: CPT | Mod: 59

## 2024-09-26 PROCEDURE — 85025 COMPLETE CBC W/AUTO DIFF WBC: CPT | Performed by: EMERGENCY MEDICINE

## 2024-09-26 PROCEDURE — 96361 HYDRATE IV INFUSION ADD-ON: CPT

## 2024-09-26 PROCEDURE — 250N000011 HC RX IP 250 OP 636: Performed by: EMERGENCY MEDICINE

## 2024-09-26 PROCEDURE — 93005 ELECTROCARDIOGRAM TRACING: CPT | Performed by: EMERGENCY MEDICINE

## 2024-09-26 PROCEDURE — 84484 ASSAY OF TROPONIN QUANT: CPT | Mod: 91 | Performed by: EMERGENCY MEDICINE

## 2024-09-26 PROCEDURE — 99285 EMERGENCY DEPT VISIT HI MDM: CPT | Mod: 25

## 2024-09-26 PROCEDURE — 80053 COMPREHEN METABOLIC PANEL: CPT | Performed by: EMERGENCY MEDICINE

## 2024-09-26 PROCEDURE — 83690 ASSAY OF LIPASE: CPT | Performed by: EMERGENCY MEDICINE

## 2024-09-26 PROCEDURE — 258N000003 HC RX IP 258 OP 636: Performed by: EMERGENCY MEDICINE

## 2024-09-26 RX ORDER — ONDANSETRON 8 MG/1
8 TABLET, ORALLY DISINTEGRATING ORAL EVERY 8 HOURS PRN
Qty: 12 TABLET | Refills: 0 | Status: SHIPPED | OUTPATIENT
Start: 2024-09-26

## 2024-09-26 RX ORDER — ONDANSETRON 2 MG/ML
4 INJECTION INTRAMUSCULAR; INTRAVENOUS ONCE
Status: COMPLETED | OUTPATIENT
Start: 2024-09-26 | End: 2024-09-26

## 2024-09-26 RX ORDER — HYDROMORPHONE HYDROCHLORIDE 1 MG/ML
0.5 INJECTION, SOLUTION INTRAMUSCULAR; INTRAVENOUS; SUBCUTANEOUS ONCE
Status: COMPLETED | OUTPATIENT
Start: 2024-09-26 | End: 2024-09-26

## 2024-09-26 RX ORDER — IOPAMIDOL 755 MG/ML
90 INJECTION, SOLUTION INTRAVASCULAR ONCE
Status: COMPLETED | OUTPATIENT
Start: 2024-09-26 | End: 2024-09-26

## 2024-09-26 RX ORDER — OXYCODONE HYDROCHLORIDE 5 MG/1
5 TABLET ORAL EVERY 6 HOURS PRN
Qty: 8 TABLET | Refills: 0 | Status: SHIPPED | OUTPATIENT
Start: 2024-09-26 | End: 2024-09-29

## 2024-09-26 RX ORDER — ACETAMINOPHEN 325 MG/1
975 TABLET ORAL ONCE
Status: COMPLETED | OUTPATIENT
Start: 2024-09-26 | End: 2024-09-26

## 2024-09-26 RX ORDER — ASPIRIN 81 MG/1
162 TABLET, CHEWABLE ORAL ONCE
Status: COMPLETED | OUTPATIENT
Start: 2024-09-26 | End: 2024-09-26

## 2024-09-26 RX ADMIN — IOPAMIDOL 90 ML: 755 INJECTION, SOLUTION INTRAVENOUS at 04:53

## 2024-09-26 RX ADMIN — HYDROMORPHONE HYDROCHLORIDE 0.5 MG: 1 INJECTION, SOLUTION INTRAMUSCULAR; INTRAVENOUS; SUBCUTANEOUS at 04:03

## 2024-09-26 RX ADMIN — SODIUM CHLORIDE 500 ML: 9 INJECTION, SOLUTION INTRAVENOUS at 04:02

## 2024-09-26 RX ADMIN — ONDANSETRON 4 MG: 2 INJECTION INTRAMUSCULAR; INTRAVENOUS at 04:02

## 2024-09-26 RX ADMIN — ASPIRIN 81 MG CHEWABLE TABLET 162 MG: 81 TABLET CHEWABLE at 04:02

## 2024-09-26 ASSESSMENT — ACTIVITIES OF DAILY LIVING (ADL)
ADLS_ACUITY_SCORE: 35

## 2024-09-26 ASSESSMENT — ENCOUNTER SYMPTOMS
HEMATURIA: 0
DIFFICULTY URINATING: 0
FREQUENCY: 0
ABDOMINAL PAIN: 1
CHILLS: 1
BACK PAIN: 1
DYSURIA: 0

## 2024-09-26 NOTE — ED PROVIDER NOTES
EMERGENCY DEPARTMENT ENCOUNTER      NAME: Luis Azevedo  AGE: 69 year old male  YOB: 1955  MRN: [id number]  EVALUATION DATE & TIME: 9/26/2024  3:42 AM    PCP: Ricardo Youngblood    ED PROVIDER: Cassie Otoole M.D.      Chief Complaint   Patient presents with    Chest Pain         FINAL IMPRESSION:  1. Chest pain, unspecified type    2. Adverse effect of vaccine, initial encounter        MEDICAL DECISION MAKING:    Pertinent Labs & Imaging studies reviewed. (See chart for details)  ED Course as of 09/26/24 0617   Thu Sep 26, 2024   0354 Afebrile.  Vital signs here with tachypnea, hypertension.  Could be secondary to discomfort.  Patient coming in with chills, feeling unwell.  Pain in his chest, nausea.  Describes a heavy/aching pain.  Radiates through to the middle of his back between his shoulder blades.  Did get all vaccines today for flu/COVID/RSV.  Symptoms started afterwards.  Does have risk factors for ACS.  Hypertension, hyperlipidemia, diabetes.  Recently had renal cell carcinoma, had right-sided nephrectomy done in May of this year, had adjuvant chemotherapy.  Not currently on any chemotherapy medications.  Has not had pain like this in the past.  No diarrhea.  Slight nausea associated with this.    Exam for patient here appears moderately uncomfortable.  Does not have any significant abdominal tenderness, pain with palpation.  No rebound guarding.  No focality.  Exam for patient here essentially unremarkable.    EKG for patient here shows sinus rhythm at a rate of 79.  There is no ST elevations or depressions.  Normal axis.  No ectopy.  Intervals are appropriate.  QTc is 399, , QRS 88.  No previous EKGs for comparison.   0356 Plan for labs, pain medication, CT.   0408 Patient is having slightly worse chills, rigors.  May be all secondary to vaccination reaction.  Still awaiting remainder of workup.   0408 CBC without leukocytosis.   0409 Hemoglobin here is 11.6, previous hemoglobin in our  system from 3/19/2023 was 14.4.  11.8 hemoglobin noted from [facility] on 9/5/2024.   0425 Patient's initial troponin here is 26.  Creatinine 1.32 which is similar to what it was previous at [facility] visit on 9/5/2024.  Still going to get a CT.  Will need delta troponin.   0425 After pain medication, blood pressure down to the 150s, respiration rate down to 14.  Does feel much improved.   0604 No acute findings on CT scan.  Awaiting delta troponin.  Updated patient as to results.  He feels comfortable with discharge.  He is feeling better at this point.  Wondering if this may be secondary to vaccine reaction.         Delta troponin 24.  2 points lower than first 1.  Feels well, looks well.  Will discharge patient to home with follow-up with primary care.  Strict return precautions discussed.    Medical Decision Making  Obtained supplemental history:Supplemental history obtained?: No  Reviewed external records: External records reviewed?: Documented in chart  Care impacted by chronic illness:Cancer/Chemotherapy, Chronic Kidney Disease, Diabetes, and Hypertension  Care significantly affected by social determinants of health:Access to Medical Care  Did you consider but not order tests?: Work up considered but not performed and documented in chart, if applicable  Did you interpret images independently?: Independent interpretation of ECG and images noted in documentation, when applicable.  Consultation discussion with other provider:Did you involve another provider (consultant, , pharmacy, etc.)?: No  Discharge. I prescribed additional prescription strength medication(s) as charted. See documentation for any additional details.  Not Applicable        Critical care: 0 minutes excluding separately billable procedures.  Includes bedside management, time reviewing test results, review of records, discussing the case with staff, documenting the medical record and time spent with family members (or surrogate  decision makers) discussing specific treatment issues.          ED COURSE:  3:46 AM I met with the patient, obtained history, performed an initial exam, and discussed options and plan for diagnostics and treatment here in the ED.     The importance of close follow up was discussed. We reviewed warning signs and symptoms, and I instructed Mr. Azevedo to return to the emergency department immediately if he develops any new or worsening symptoms. I provided additional verbal discharge instructions. Mr. Azevedo expressed understanding and agreement with this plan of care, his questions were answered, and he was discharged in stable condition.     MEDICATIONS GIVEN IN THE EMERGENCY:  Medications   HYDROmorphone (PF) (DILAUDID) injection 0.5 mg (0.5 mg Intravenous $Given 9/26/24 0403)   ondansetron (ZOFRAN) injection 4 mg (4 mg Intravenous $Given 9/26/24 0402)   aspirin (ASA) chewable tablet 162 mg (162 mg Oral $Given 9/26/24 0402)   sodium chloride 0.9% BOLUS 500 mL (0 mLs Intravenous Stopped 9/26/24 0510)   acetaminophen (TYLENOL) tablet 975 mg (975 mg Oral Not Given 9/26/24 0427)   iopamidol (ISOVUE-370) solution 90 mL (90 mLs Intravenous $Given 9/26/24 0453)       NEW PRESCRIPTIONS STARTED AT TODAY'S ER VISIT:  New Prescriptions    ONDANSETRON (ZOFRAN ODT) 8 MG ODT TAB    Take 1 tablet (8 mg) by mouth every 8 hours as needed for nausea.    OXYCODONE (ROXICODONE) 5 MG TABLET    Take 1 tablet (5 mg) by mouth every 6 hours as needed for pain.          =================================================================    HPI    Patient information was obtained from: The patient    Use of : N/A         Luis Azevedo is a 69 year old male who presents with chest pain.    Per chart review,     09/11/2024 - The patient had an office visit with primary care at [facility]. 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 Patient is improving and getting more  active. Planing for screening for malignant neoplasm of colon.    The patient developed sternal chest pain and epigastric pain around 10 PM last night. He describes his pain as sharp and aching. He states his chest pain does radiate to the upper back. He had his COVID/RSV/Influenza vaccine yesterday and developed chills last night as well. He took a dose of Tylenol shortly after onset of chills and chest pain. He tried to sleep it off but notes his pain was progressively worsening. He is nauseous but denies any vomiting episodes. He had a his right kidney removed back in May after finishing his chemotherapy treatment. No complaints of urinary symptoms, shortness of breath, or any other associated symptoms at this time.      REVIEW OF SYSTEMS   Review of Systems   Constitutional:  Positive for chills.   Cardiovascular:  Positive for chest pain (sternal, sharp and aching).   Gastrointestinal:  Positive for abdominal pain (epigastric).   Genitourinary:  Negative for difficulty urinating, dysuria, frequency, hematuria and urgency.   Musculoskeletal:  Positive for back pain (upper).   All other systems reviewed and are negative.        PAST MEDICAL HISTORY:  No past medical history on file.    PAST SURGICAL HISTORY:  No past surgical history on file.    CURRENT MEDICATIONS:    No current facility-administered medications for this encounter.    Current Outpatient Medications:     ondansetron (ZOFRAN ODT) 8 MG ODT tab, Take 1 tablet (8 mg) by mouth every 8 hours as needed for nausea., Disp: 12 tablet, Rfl: 0    oxyCODONE (ROXICODONE) 5 MG tablet, Take 1 tablet (5 mg) by mouth every 6 hours as needed for pain., Disp: 8 tablet, Rfl: 0    diclofenac (VOLTAREN) 75 MG EC tablet, TAKE 1 TABLET (75 MG) BY MOUTH TWICE A DAY, Disp: , Rfl:     lisinopril-hydrochlorothiazide (ZESTORETIC) 20-25 MG tablet, Take 1 tablet by mouth daily, Disp: , Rfl:     metFORMIN (GLUCOPHAGE) 500 MG tablet, TAKE 1 TABLET (500 MG) BY MOUTH TWICE A DAY  "WITH MEALS, Disp: , Rfl:     rosuvastatin (CRESTOR) 5 MG tablet, Take 5 mg by mouth, Disp: , Rfl:     ALLERGIES:  No Known Allergies    FAMILY HISTORY:  No family history on file.    SOCIAL HISTORY:   Social History     Socioeconomic History    Marital status:    Tobacco Use    Smoking status: Never    Smokeless tobacco: Never   Substance and Sexual Activity    Alcohol use: Yes     Comment: minimal     Social Determinants of Health     Food Insecurity: No Food Insecurity (5/17/2024)    Received from [facility]    Hunger Vital Sign     Worried About Running Out of Food in the Last Year: Never true     Ran Out of Food in the Last Year: Never true   Transportation Needs: No Transportation Needs (5/17/2024)    Received from [facility]    PRAPARE - Transportation     Lack of Transportation (Medical): No     Lack of Transportation (Non-Medical): No   Physical Activity: Insufficiently Active (1/24/2024)    Received from [facility]    Exercise Vital Sign     Days of Exercise per Week: 1 day     Minutes of Exercise per Session: 10 min   Interpersonal Safety: Not At Risk (5/17/2024)    Received from [facility]    Humiliation, Afraid, Rape, and Kick questionnaire     Fear of Current or Ex-Partner: No     Emotionally Abused: No     Physically Abused: No     Sexually Abused: No   Housing Stability: Low Risk  (5/17/2024)    Received from [facility]    Housing Stability     What is your living situation today?: I have a steady place to live       PHYSICAL EXAM:    Vitals: /67   Pulse 92   Temp 98.3  F (36.8  C) (Oral)   Resp 14   Ht 1.778 m (5' 10\")   Wt 97.5 kg (215 lb)   SpO2 97%   BMI 30.85 kg/m     General:. Alert and interactive, moderately uncomfortable appearing.  HENT: Oropharynx without erythema or exudates. MMM.  TMs clear bilaterally.  Eyes: Pupils mid-sized and equally reactive.   Neck: Full AROM.  No midline tenderness to palpation.  Cardiovascular: Regular rate and rhythm. Peripheral pulses " 2+ bilaterally.  Chest/Pulmonary: Normal work of breathing. Lung sounds clear and equal throughout, no wheezes or crackles. No chest wall tenderness or deformities.  Abdomen: Soft, nondistended. No significant abdominal tenderness, pain with palpation. No guarding or rebound. No focality.  Back/Spine: No CVA or midline tenderness.  Extremities: Normal ROM of all major joints. No lower extremity edema.   Skin: Warm and dry. Normal skin color.   Neuro: Speech clear. CNs grossly intact. Moves all extremities appropriately. Strength and sensation grossly intact to all extremities.   Psych: Normal affect/mood, cooperative, memory appropriate.     LAB:  All pertinent labs reviewed and interpreted.  Labs Ordered and Resulted from Time of ED Arrival to Time of ED Departure   COMPREHENSIVE METABOLIC PANEL - Abnormal       Result Value    Sodium 138      Potassium 4.1      Carbon Dioxide (CO2) 21 (*)     Anion Gap 15      Urea Nitrogen 29.2 (*)     Creatinine 1.32 (*)     GFR Estimate 58 (*)     Calcium 10.0      Chloride 102      Glucose 155 (*)     Alkaline Phosphatase 62      AST 26      ALT 22      Protein Total 8.3      Albumin 4.5      Bilirubin Total 0.5     TROPONIN T, HIGH SENSITIVITY - Abnormal    Troponin T, High Sensitivity 26 (*)    CBC WITH PLATELETS AND DIFFERENTIAL - Abnormal    WBC Count 5.6      RBC Count 4.21 (*)     Hemoglobin 11.6 (*)     Hematocrit 34.8 (*)     MCV 83      MCH 27.6      MCHC 33.3      RDW 17.2 (*)     Platelet Count 113 (*)     % Neutrophils 83      % Lymphocytes 7      % Monocytes 9      % Eosinophils 1      % Basophils 0      % Immature Granulocytes 0      NRBCs per 100 WBC 0      Absolute Neutrophils 4.7      Absolute Lymphocytes 0.4 (*)     Absolute Monocytes 0.5      Absolute Eosinophils 0.0      Absolute Basophils 0.0      Absolute Immature Granulocytes 0.0      Absolute NRBCs 0.0     TROPONIN T, HIGH SENSITIVITY - Abnormal    Troponin T, High Sensitivity 24 (*)    LIPASE - Normal     Lipase 56         RADIOLOGY:  CTA Chest Abdomen Pelvis w Contrast   Final Result   IMPRESSION:      1. No evidence of pulmonary embolism or significant arterial lesion in the chest, abdomen, or the pelvis.   2. Postoperative change from right nephrectomy. Well-circumscribed, low-density structure surrounded by surgical clips adjacent to the aorta is similar in size as a lesion described is a fluid collection on the report of a CT from [facility] August 22, 2024, images not available. No finding compelling for progressive recurrent or metastatic disease.   3. Cholelithiasis.          EKG:  See MDM    I have independently reviewed and interpreted the EKG(s) documented above.         I, Kenji Rothman, am serving as a scribe to document services personally performed by Dr. Cassie Otoole  based on my observation and the provider's statements to me. I, Cassie Otoole MD attest that Kenji Rothman is acting in a scribe capacity, has observed my performance of the services and has documented them in accordance with my direction.      Cassie Otoole M.D.  Emergency Medicine  [facility] EMERGENCY ROOM  [address] [id number] [phone]  Dept: [phone]     Cassie Otoole MD  09/26/24 0617

## 2024-09-26 NOTE — ED TRIAGE NOTES
Pt reports receiving vaccines for flu, covid, and RSV 9/25. Around 2100, pt began to have chills and feel unwell. Took tylenol around 2200. Pt has had worsening symptoms of chest pain, chills, nausea. Pain feels heavy, aching and has pain through to the middle of his back.      Triage Assessment (Adult)       Row Name 09/26/24 0351          Triage Assessment    Airway WDL WDL        Respiratory WDL    Respiratory WDL WDL        Skin Circulation/Temperature WDL    Skin Circulation/Temperature WDL WDL        Cardiac WDL    Cardiac WDL X;chest pain        Chest Pain Assessment    Chest Pain Location retrosternal     Chest Pain Radiation back        Peripheral/Neurovascular WDL    Peripheral Neurovascular WDL WDL        Cognitive/Neuro/Behavioral WDL    Cognitive/Neuro/Behavioral WDL WDL

## 2024-09-26 NOTE — DISCHARGE INSTRUCTIONS
Blood work at baseline.  CT scan chest abdomen pelvis without acute issues identified.  You had normal blood work, EKG, CAT scan.  He may be having some chills, but body pain, reaction to vaccine administration.  Take pain medication as needed for discomfort at home.  You can also take Tylenol.  Zofran for nausea.  If symptoms persist or change or worsen in any way please do not hesitate to come back to the emergency department for reevaluation.  Otherwise plan to follow-up with your primary care doctor next week.

## 2024-10-06 ENCOUNTER — HEALTH MAINTENANCE LETTER (OUTPATIENT)
Age: 69
End: 2024-10-06

## 2025-01-19 ENCOUNTER — HEALTH MAINTENANCE LETTER (OUTPATIENT)
Age: 70
End: 2025-01-19

## 2025-04-27 ENCOUNTER — HEALTH MAINTENANCE LETTER (OUTPATIENT)
Age: 70
End: 2025-04-27

## 2025-08-10 ENCOUNTER — HEALTH MAINTENANCE LETTER (OUTPATIENT)
Age: 70
End: 2025-08-10